# Patient Record
Sex: FEMALE | Race: WHITE | NOT HISPANIC OR LATINO | Employment: FULL TIME | URBAN - METROPOLITAN AREA
[De-identification: names, ages, dates, MRNs, and addresses within clinical notes are randomized per-mention and may not be internally consistent; named-entity substitution may affect disease eponyms.]

---

## 2017-01-03 ENCOUNTER — GENERIC CONVERSION - ENCOUNTER (OUTPATIENT)
Dept: OTHER | Facility: OTHER | Age: 32
End: 2017-01-03

## 2017-01-03 ENCOUNTER — ALLSCRIPTS OFFICE VISIT (OUTPATIENT)
Dept: OTHER | Facility: OTHER | Age: 32
End: 2017-01-03

## 2017-01-03 DIAGNOSIS — Z34.82 ENCOUNTER FOR SUPERVISION OF OTHER NORMAL PREGNANCY, SECOND TRIMESTER: ICD-10-CM

## 2017-01-16 ENCOUNTER — GENERIC CONVERSION - ENCOUNTER (OUTPATIENT)
Dept: OTHER | Facility: OTHER | Age: 32
End: 2017-01-16

## 2017-01-21 ENCOUNTER — APPOINTMENT (OUTPATIENT)
Dept: LAB | Age: 32
End: 2017-01-21
Payer: COMMERCIAL

## 2017-01-21 ENCOUNTER — TRANSCRIBE ORDERS (OUTPATIENT)
Dept: ADMINISTRATIVE | Age: 32
End: 2017-01-21

## 2017-01-21 DIAGNOSIS — Z34.82 ENCOUNTER FOR SUPERVISION OF OTHER NORMAL PREGNANCY, SECOND TRIMESTER: ICD-10-CM

## 2017-01-21 LAB
BASOPHILS # BLD AUTO: 0.01 THOUSANDS/ΜL (ref 0–0.1)
BASOPHILS NFR BLD AUTO: 0 % (ref 0–1)
EOSINOPHIL # BLD AUTO: 0.09 THOUSAND/ΜL (ref 0–0.61)
EOSINOPHIL NFR BLD AUTO: 1 % (ref 0–6)
ERYTHROCYTE [DISTWIDTH] IN BLOOD BY AUTOMATED COUNT: 13.4 % (ref 11.6–15.1)
GLUCOSE 1H P 50 G GLC PO SERPL-MCNC: 127 MG/DL
HCT VFR BLD AUTO: 35.1 % (ref 34.8–46.1)
HGB BLD-MCNC: 11.5 G/DL (ref 11.5–15.4)
LYMPHOCYTES # BLD AUTO: 1.31 THOUSANDS/ΜL (ref 0.6–4.47)
LYMPHOCYTES NFR BLD AUTO: 16 % (ref 14–44)
MCH RBC QN AUTO: 28.4 PG (ref 26.8–34.3)
MCHC RBC AUTO-ENTMCNC: 32.8 G/DL (ref 31.4–37.4)
MCV RBC AUTO: 87 FL (ref 82–98)
MONOCYTES # BLD AUTO: 0.51 THOUSAND/ΜL (ref 0.17–1.22)
MONOCYTES NFR BLD AUTO: 6 % (ref 4–12)
NEUTROPHILS # BLD AUTO: 6.48 THOUSANDS/ΜL (ref 1.85–7.62)
NEUTS SEG NFR BLD AUTO: 77 % (ref 43–75)
NRBC BLD AUTO-RTO: 0 /100 WBCS
PLATELET # BLD AUTO: 202 THOUSANDS/UL (ref 149–390)
PMV BLD AUTO: 11.8 FL (ref 8.9–12.7)
RBC # BLD AUTO: 4.05 MILLION/UL (ref 3.81–5.12)
WBC # BLD AUTO: 8.43 THOUSAND/UL (ref 4.31–10.16)

## 2017-01-21 PROCEDURE — 36415 COLL VENOUS BLD VENIPUNCTURE: CPT

## 2017-01-21 PROCEDURE — 82950 GLUCOSE TEST: CPT

## 2017-01-21 PROCEDURE — 85025 COMPLETE CBC W/AUTO DIFF WBC: CPT

## 2017-01-30 ENCOUNTER — GENERIC CONVERSION - ENCOUNTER (OUTPATIENT)
Dept: OTHER | Facility: OTHER | Age: 32
End: 2017-01-30

## 2017-02-17 ENCOUNTER — GENERIC CONVERSION - ENCOUNTER (OUTPATIENT)
Dept: OTHER | Facility: OTHER | Age: 32
End: 2017-02-17

## 2017-02-28 ENCOUNTER — HOSPITAL ENCOUNTER (OUTPATIENT)
Facility: HOSPITAL | Age: 32
Discharge: HOME | End: 2017-02-28
Attending: OBSTETRICS & GYNECOLOGY | Admitting: OBSTETRICS & GYNECOLOGY
Payer: COMMERCIAL

## 2017-02-28 VITALS
HEART RATE: 80 BPM | SYSTOLIC BLOOD PRESSURE: 112 MMHG | RESPIRATION RATE: 18 BRPM | WEIGHT: 165 LBS | BODY MASS INDEX: 26.52 KG/M2 | HEIGHT: 66 IN | OXYGEN SATURATION: 95 % | DIASTOLIC BLOOD PRESSURE: 56 MMHG | TEMPERATURE: 98 F

## 2017-02-28 DIAGNOSIS — O26.90: ICD-10-CM

## 2017-02-28 LAB
ANION GAP SERPL CALCULATED.3IONS-SCNC: 7 MMOL/L (ref 4–13)
BASOPHILS # BLD MANUAL: 0.14 THOUSAND/UL (ref 0–0.1)
BASOPHILS NFR MAR MANUAL: 1 % (ref 0–1)
BUN SERPL-MCNC: 10 MG/DL (ref 5–25)
CALCIUM SERPL-MCNC: 8.7 MG/DL (ref 8.3–10.1)
CHLORIDE SERPL-SCNC: 107 MMOL/L (ref 100–108)
CO2 SERPL-SCNC: 25 MMOL/L (ref 21–32)
CREAT SERPL-MCNC: 0.49 MG/DL (ref 0.6–1.3)
EOSINOPHIL # BLD MANUAL: 0 THOUSAND/UL (ref 0–0.4)
EOSINOPHIL NFR BLD MANUAL: 0 % (ref 0–6)
ERYTHROCYTE [DISTWIDTH] IN BLOOD BY AUTOMATED COUNT: 13.3 % (ref 11.6–15.1)
GFR SERPL CREATININE-BSD FRML MDRD: >60 ML/MIN/1.73SQ M
GLUCOSE SERPL-MCNC: 92 MG/DL (ref 65–140)
HCT VFR BLD AUTO: 40.2 % (ref 34.8–46.1)
HGB BLD-MCNC: 13.4 G/DL (ref 11.5–15.4)
LYMPHOCYTES # BLD AUTO: 0.85 THOUSAND/UL (ref 0.6–4.47)
LYMPHOCYTES # BLD AUTO: 6 % (ref 14–44)
MCH RBC QN AUTO: 28 PG (ref 26.8–34.3)
MCHC RBC AUTO-ENTMCNC: 33.3 G/DL (ref 31.4–37.4)
MCV RBC AUTO: 84 FL (ref 82–98)
MONOCYTES # BLD AUTO: 0.71 THOUSAND/UL (ref 0–1.22)
MONOCYTES NFR BLD: 5 % (ref 4–12)
NEUTROPHILS # BLD MANUAL: 12.47 THOUSAND/UL (ref 1.85–7.62)
NEUTS BAND NFR BLD MANUAL: 3 % (ref 0–8)
NEUTS SEG NFR BLD AUTO: 85 % (ref 43–75)
NRBC BLD AUTO-RTO: 0 /100 WBCS
PLATELET # BLD AUTO: 206 THOUSANDS/UL (ref 149–390)
PLATELET BLD QL SMEAR: ADEQUATE
PMV BLD AUTO: 11.8 FL (ref 8.9–12.7)
POTASSIUM SERPL-SCNC: 4 MMOL/L (ref 3.5–5.3)
RBC # BLD AUTO: 4.79 MILLION/UL (ref 3.81–5.12)
RBC MORPH BLD: NORMAL
SODIUM SERPL-SCNC: 139 MMOL/L (ref 136–145)
WBC # BLD AUTO: 14.17 THOUSAND/UL (ref 4.31–10.16)

## 2017-02-28 PROCEDURE — 99214 OFFICE O/P EST MOD 30 MIN: CPT

## 2017-02-28 PROCEDURE — 80048 BASIC METABOLIC PNL TOTAL CA: CPT | Performed by: OBSTETRICS & GYNECOLOGY

## 2017-02-28 PROCEDURE — 85027 COMPLETE CBC AUTOMATED: CPT | Performed by: OBSTETRICS & GYNECOLOGY

## 2017-02-28 PROCEDURE — 96360 HYDRATION IV INFUSION INIT: CPT

## 2017-02-28 PROCEDURE — 96374 THER/PROPH/DIAG INJ IV PUSH: CPT

## 2017-02-28 PROCEDURE — 85007 BL SMEAR W/DIFF WBC COUNT: CPT | Performed by: OBSTETRICS & GYNECOLOGY

## 2017-02-28 RX ORDER — ONDANSETRON 2 MG/ML
INJECTION INTRAMUSCULAR; INTRAVENOUS
Status: COMPLETED
Start: 2017-02-28 | End: 2017-02-28

## 2017-02-28 RX ORDER — ONDANSETRON 2 MG/ML
4 INJECTION INTRAMUSCULAR; INTRAVENOUS ONCE
Status: COMPLETED | OUTPATIENT
Start: 2017-02-28 | End: 2017-02-28

## 2017-02-28 RX ADMIN — SODIUM CHLORIDE, SODIUM LACTATE, POTASSIUM CHLORIDE, AND CALCIUM CHLORIDE 1000 ML: .6; .31; .03; .02 INJECTION, SOLUTION INTRAVENOUS at 06:47

## 2017-02-28 RX ADMIN — ONDANSETRON 4 MG: 2 INJECTION INTRAMUSCULAR; INTRAVENOUS at 06:48

## 2017-02-28 RX ADMIN — SODIUM CHLORIDE, SODIUM LACTATE, POTASSIUM CHLORIDE, AND CALCIUM CHLORIDE 1000 ML: .6; .31; .03; .02 INJECTION, SOLUTION INTRAVENOUS at 07:41

## 2017-03-02 LAB — EXTERNAL GROUP B STREP ANTIGEN: POSITIVE

## 2017-03-04 ENCOUNTER — LAB CONVERSION - ENCOUNTER (OUTPATIENT)
Dept: OTHER | Facility: OTHER | Age: 32
End: 2017-03-04

## 2017-03-10 ENCOUNTER — GENERIC CONVERSION - ENCOUNTER (OUTPATIENT)
Dept: OTHER | Facility: OTHER | Age: 32
End: 2017-03-10

## 2017-03-17 ENCOUNTER — GENERIC CONVERSION - ENCOUNTER (OUTPATIENT)
Dept: OTHER | Facility: OTHER | Age: 32
End: 2017-03-17

## 2017-03-21 ENCOUNTER — HOSPITAL ENCOUNTER (OUTPATIENT)
Facility: HOSPITAL | Age: 32
Discharge: HOME | End: 2017-03-21
Attending: OBSTETRICS & GYNECOLOGY | Admitting: OBSTETRICS & GYNECOLOGY
Payer: COMMERCIAL

## 2017-03-21 VITALS
SYSTOLIC BLOOD PRESSURE: 126 MMHG | RESPIRATION RATE: 18 BRPM | DIASTOLIC BLOOD PRESSURE: 72 MMHG | HEART RATE: 96 BPM | TEMPERATURE: 98 F

## 2017-03-21 PROCEDURE — 99214 OFFICE O/P EST MOD 30 MIN: CPT

## 2017-03-24 ENCOUNTER — GENERIC CONVERSION - ENCOUNTER (OUTPATIENT)
Dept: OTHER | Facility: OTHER | Age: 32
End: 2017-03-24

## 2017-03-29 ENCOUNTER — HOSPITAL ENCOUNTER (INPATIENT)
Facility: HOSPITAL | Age: 32
LOS: 3 days | Discharge: HOME | End: 2017-04-01
Attending: OBSTETRICS & GYNECOLOGY | Admitting: OBSTETRICS & GYNECOLOGY
Payer: COMMERCIAL

## 2017-03-29 DIAGNOSIS — O47.1 FALSE LABOR AFTER 37 WEEKS OF GESTATION WITHOUT DELIVERY: ICD-10-CM

## 2017-03-29 DIAGNOSIS — Z3A.40 40 WEEKS GESTATION OF PREGNANCY: Primary | ICD-10-CM

## 2017-03-29 LAB
ABO GROUP BLD: NORMAL
BASOPHILS # BLD AUTO: 0.01 THOUSANDS/ΜL (ref 0–0.1)
BASOPHILS NFR BLD AUTO: 0 % (ref 0–1)
BLD GP AB SCN SERPL QL: NEGATIVE
EOSINOPHIL # BLD AUTO: 0.06 THOUSAND/ΜL (ref 0–0.61)
EOSINOPHIL NFR BLD AUTO: 1 % (ref 0–6)
ERYTHROCYTE [DISTWIDTH] IN BLOOD BY AUTOMATED COUNT: 13.4 % (ref 11.6–15.1)
HCT VFR BLD AUTO: 37.7 % (ref 34.8–46.1)
HGB BLD-MCNC: 12.4 G/DL (ref 11.5–15.4)
LYMPHOCYTES # BLD AUTO: 2.23 THOUSANDS/ΜL (ref 0.6–4.47)
LYMPHOCYTES NFR BLD AUTO: 19 % (ref 14–44)
MCH RBC QN AUTO: 27.3 PG (ref 26.8–34.3)
MCHC RBC AUTO-ENTMCNC: 32.9 G/DL (ref 31.4–37.4)
MCV RBC AUTO: 83 FL (ref 82–98)
MONOCYTES # BLD AUTO: 0.99 THOUSAND/ΜL (ref 0.17–1.22)
MONOCYTES NFR BLD AUTO: 8 % (ref 4–12)
NEUTROPHILS # BLD AUTO: 8.71 THOUSANDS/ΜL (ref 1.85–7.62)
NEUTS SEG NFR BLD AUTO: 72 % (ref 43–75)
NRBC BLD AUTO-RTO: 0 /100 WBCS
PLATELET # BLD AUTO: 220 THOUSANDS/UL (ref 149–390)
PMV BLD AUTO: 11.7 FL (ref 8.9–12.7)
RBC # BLD AUTO: 4.55 MILLION/UL (ref 3.81–5.12)
RH BLD: POSITIVE
WBC # BLD AUTO: 12.06 THOUSAND/UL (ref 4.31–10.16)

## 2017-03-29 PROCEDURE — 86900 BLOOD TYPING SEROLOGIC ABO: CPT | Performed by: OBSTETRICS & GYNECOLOGY

## 2017-03-29 PROCEDURE — 86592 SYPHILIS TEST NON-TREP QUAL: CPT | Performed by: OBSTETRICS & GYNECOLOGY

## 2017-03-29 PROCEDURE — 85025 COMPLETE CBC W/AUTO DIFF WBC: CPT | Performed by: OBSTETRICS & GYNECOLOGY

## 2017-03-29 PROCEDURE — 86850 RBC ANTIBODY SCREEN: CPT | Performed by: OBSTETRICS & GYNECOLOGY

## 2017-03-29 PROCEDURE — 99214 OFFICE O/P EST MOD 30 MIN: CPT

## 2017-03-29 PROCEDURE — 86901 BLOOD TYPING SEROLOGIC RH(D): CPT | Performed by: OBSTETRICS & GYNECOLOGY

## 2017-03-29 RX ORDER — SODIUM CHLORIDE, SODIUM LACTATE, POTASSIUM CHLORIDE, CALCIUM CHLORIDE 600; 310; 30; 20 MG/100ML; MG/100ML; MG/100ML; MG/100ML
125 INJECTION, SOLUTION INTRAVENOUS CONTINUOUS
Status: DISCONTINUED | OUTPATIENT
Start: 2017-03-29 | End: 2017-03-30

## 2017-03-29 RX ADMIN — SODIUM CHLORIDE, SODIUM LACTATE, POTASSIUM CHLORIDE, AND CALCIUM CHLORIDE 125 ML/HR: .6; .31; .03; .02 INJECTION, SOLUTION INTRAVENOUS at 21:41

## 2017-03-29 RX ADMIN — SODIUM CHLORIDE 5 MILLION UNITS: 0.9 INJECTION, SOLUTION INTRAVENOUS at 21:41

## 2017-03-30 ENCOUNTER — ANESTHESIA (INPATIENT)
Dept: LABOR AND DELIVERY | Facility: HOSPITAL | Age: 32
End: 2017-03-30
Payer: COMMERCIAL

## 2017-03-30 ENCOUNTER — ANESTHESIA EVENT (INPATIENT)
Dept: LABOR AND DELIVERY | Facility: HOSPITAL | Age: 32
End: 2017-03-30
Payer: COMMERCIAL

## 2017-03-30 PROBLEM — Z3A.40 40 WEEKS GESTATION OF PREGNANCY: Status: RESOLVED | Noted: 2017-03-29 | Resolved: 2017-03-30

## 2017-03-30 LAB
BASE EXCESS BLDCOA CALC-SCNC: -1.2 MMOL/L (ref 3–11)
BASE EXCESS BLDCOV CALC-SCNC: -0.1 MMOL/L (ref 1–9)
HCO3 BLDCOA-SCNC: 25.8 MMOL/L (ref 17.3–27.3)
HCO3 BLDCOV-SCNC: 25.5 MMOL/L (ref 12.2–28.6)
O2 CT VFR BLDCOA CALC: 11.7 ML/DL
OXYHGB MFR BLDCOA: 51.2 %
OXYHGB MFR BLDCOV: 75.1 %
PCO2 BLDCOA: 51.2 MM[HG] (ref 30–60)
PCO2 BLDCOV: 45.2 MM HG (ref 27–43)
PH BLDCOA: 7.32 [PH] (ref 7.23–7.43)
PH BLDCOV: 7.37 [PH] (ref 7.19–7.49)
PO2 BLDCOA: 21.6 MM HG (ref 5–25)
PO2 BLDCOV: 30 MM HG (ref 15–45)
SAO2 % BLDCOV: 17.2 ML/DL

## 2017-03-30 PROCEDURE — 82805 BLOOD GASES W/O2 SATURATION: CPT | Performed by: OBSTETRICS & GYNECOLOGY

## 2017-03-30 RX ORDER — OXYCODONE HYDROCHLORIDE AND ACETAMINOPHEN 5; 325 MG/1; MG/1
2 TABLET ORAL EVERY 4 HOURS PRN
Status: DISCONTINUED | OUTPATIENT
Start: 2017-03-30 | End: 2017-04-01 | Stop reason: HOSPADM

## 2017-03-30 RX ORDER — OXYTOCIN/RINGER'S LACTATE 30/500 ML
2 PLASTIC BAG, INJECTION (ML) INTRAVENOUS
Status: DISCONTINUED | OUTPATIENT
Start: 2017-03-30 | End: 2017-03-30

## 2017-03-30 RX ORDER — BISACODYL 10 MG
10 SUPPOSITORY, RECTAL RECTAL DAILY PRN
Status: DISCONTINUED | OUTPATIENT
Start: 2017-03-30 | End: 2017-04-01 | Stop reason: HOSPADM

## 2017-03-30 RX ORDER — DIPHENHYDRAMINE HCL 25 MG
25 TABLET ORAL EVERY 6 HOURS PRN
Status: DISCONTINUED | OUTPATIENT
Start: 2017-03-30 | End: 2017-04-01 | Stop reason: HOSPADM

## 2017-03-30 RX ORDER — SIMETHICONE 80 MG
80 TABLET,CHEWABLE ORAL 4 TIMES DAILY PRN
Status: DISCONTINUED | OUTPATIENT
Start: 2017-03-30 | End: 2017-04-01 | Stop reason: HOSPADM

## 2017-03-30 RX ORDER — DIAPER,BRIEF,INFANT-TODD,DISP
1 EACH MISCELLANEOUS AS NEEDED
Status: DISCONTINUED | OUTPATIENT
Start: 2017-03-30 | End: 2017-04-01 | Stop reason: HOSPADM

## 2017-03-30 RX ORDER — OXYCODONE HYDROCHLORIDE AND ACETAMINOPHEN 5; 325 MG/1; MG/1
1 TABLET ORAL EVERY 4 HOURS PRN
Status: DISCONTINUED | OUTPATIENT
Start: 2017-03-30 | End: 2017-04-01 | Stop reason: HOSPADM

## 2017-03-30 RX ORDER — OXYTOCIN/RINGER'S LACTATE 30/500 ML
250 PLASTIC BAG, INJECTION (ML) INTRAVENOUS CONTINUOUS
Status: ACTIVE | OUTPATIENT
Start: 2017-03-30 | End: 2017-03-30

## 2017-03-30 RX ORDER — ROPIVACAINE HYDROCHLORIDE 2 MG/ML
INJECTION, SOLUTION EPIDURAL; INFILTRATION; PERINEURAL AS NEEDED
Status: DISCONTINUED | OUTPATIENT
Start: 2017-03-30 | End: 2017-03-30 | Stop reason: SURG

## 2017-03-30 RX ORDER — ACETAMINOPHEN 325 MG/1
650 TABLET ORAL EVERY 4 HOURS PRN
Status: DISCONTINUED | OUTPATIENT
Start: 2017-03-30 | End: 2017-04-01 | Stop reason: HOSPADM

## 2017-03-30 RX ORDER — OXYTOCIN/RINGER'S LACTATE 30/500 ML
PLASTIC BAG, INJECTION (ML) INTRAVENOUS
Status: COMPLETED
Start: 2017-03-30 | End: 2017-03-30

## 2017-03-30 RX ORDER — DOCUSATE SODIUM 100 MG/1
100 CAPSULE, LIQUID FILLED ORAL 2 TIMES DAILY
Status: DISCONTINUED | OUTPATIENT
Start: 2017-03-30 | End: 2017-04-01 | Stop reason: HOSPADM

## 2017-03-30 RX ORDER — IBUPROFEN 600 MG/1
600 TABLET ORAL EVERY 6 HOURS PRN
Status: DISCONTINUED | OUTPATIENT
Start: 2017-03-30 | End: 2017-04-01 | Stop reason: HOSPADM

## 2017-03-30 RX ORDER — ROPIVACAINE HYDROCHLORIDE 2 MG/ML
INJECTION, SOLUTION EPIDURAL; INFILTRATION; PERINEURAL CONTINUOUS PRN
Status: DISCONTINUED | OUTPATIENT
Start: 2017-03-30 | End: 2017-03-30 | Stop reason: SURG

## 2017-03-30 RX ORDER — LIDOCAINE HYDROCHLORIDE AND EPINEPHRINE 15; 5 MG/ML; UG/ML
INJECTION, SOLUTION EPIDURAL AS NEEDED
Status: DISCONTINUED | OUTPATIENT
Start: 2017-03-30 | End: 2017-03-30 | Stop reason: SURG

## 2017-03-30 RX ADMIN — LIDOCAINE HYDROCHLORIDE AND EPINEPHRINE 3 ML: 15; 5 INJECTION, SOLUTION EPIDURAL at 03:58

## 2017-03-30 RX ADMIN — DOCUSATE SODIUM 100 MG: 100 CAPSULE, LIQUID FILLED ORAL at 08:04

## 2017-03-30 RX ADMIN — IBUPROFEN 600 MG: 600 TABLET, FILM COATED ORAL at 17:08

## 2017-03-30 RX ADMIN — SODIUM CHLORIDE, SODIUM LACTATE, POTASSIUM CHLORIDE, AND CALCIUM CHLORIDE 125 ML/HR: .6; .31; .03; .02 INJECTION, SOLUTION INTRAVENOUS at 05:29

## 2017-03-30 RX ADMIN — SODIUM CHLORIDE, SODIUM LACTATE, POTASSIUM CHLORIDE, AND CALCIUM CHLORIDE 999 ML/HR: .6; .31; .03; .02 INJECTION, SOLUTION INTRAVENOUS at 03:32

## 2017-03-30 RX ADMIN — DOCUSATE SODIUM 100 MG: 100 CAPSULE, LIQUID FILLED ORAL at 17:10

## 2017-03-30 RX ADMIN — LIDOCAINE HYDROCHLORIDE AND EPINEPHRINE 3 ML: 15; 5 INJECTION, SOLUTION EPIDURAL at 03:59

## 2017-03-30 RX ADMIN — Medication 2 MILLI-UNITS/MIN: at 05:01

## 2017-03-30 RX ADMIN — BENZOCAINE AND MENTHOL: 20; .5 SPRAY TOPICAL at 08:04

## 2017-03-30 RX ADMIN — SODIUM CHLORIDE 2.5 MILLION UNITS: 9 INJECTION, SOLUTION INTRAVENOUS at 05:29

## 2017-03-30 RX ADMIN — HYDROCORTISONE 1 APPLICATION: 10 CREAM TOPICAL at 08:04

## 2017-03-30 RX ADMIN — ROPIVACAINE HYDROCHLORIDE 7 ML: 2 INJECTION, SOLUTION EPIDURAL; INFILTRATION at 03:59

## 2017-03-30 RX ADMIN — WITCH HAZEL 1 PAD: 500 SOLUTION RECTAL; TOPICAL at 08:04

## 2017-03-30 RX ADMIN — SODIUM CHLORIDE 2.5 MILLION UNITS: 9 INJECTION, SOLUTION INTRAVENOUS at 01:24

## 2017-03-30 RX ADMIN — IBUPROFEN 600 MG: 600 TABLET, FILM COATED ORAL at 23:19

## 2017-03-30 RX ADMIN — ROPIVACAINE HYDROCHLORIDE 12 ML/HR: 2 INJECTION, SOLUTION EPIDURAL; INFILTRATION at 03:59

## 2017-03-30 RX ADMIN — IBUPROFEN 600 MG: 600 TABLET, FILM COATED ORAL at 11:07

## 2017-03-31 LAB — RPR SER QL: NORMAL

## 2017-03-31 RX ADMIN — DOCUSATE SODIUM 100 MG: 100 CAPSULE, LIQUID FILLED ORAL at 08:31

## 2017-03-31 RX ADMIN — DOCUSATE SODIUM 100 MG: 100 CAPSULE, LIQUID FILLED ORAL at 17:23

## 2017-03-31 RX ADMIN — IBUPROFEN 600 MG: 600 TABLET, FILM COATED ORAL at 17:23

## 2017-03-31 RX ADMIN — IBUPROFEN 600 MG: 600 TABLET, FILM COATED ORAL at 06:18

## 2017-04-01 VITALS
OXYGEN SATURATION: 99 % | SYSTOLIC BLOOD PRESSURE: 123 MMHG | HEART RATE: 87 BPM | RESPIRATION RATE: 20 BRPM | BODY MASS INDEX: 28.93 KG/M2 | DIASTOLIC BLOOD PRESSURE: 77 MMHG | HEIGHT: 66 IN | WEIGHT: 180 LBS | TEMPERATURE: 98.2 F

## 2017-04-01 RX ORDER — IBUPROFEN 600 MG/1
600 TABLET ORAL EVERY 6 HOURS PRN
Qty: 30 TABLET | Refills: 0
Start: 2017-04-01 | End: 2017-06-07

## 2017-04-01 RX ADMIN — IBUPROFEN 600 MG: 600 TABLET, FILM COATED ORAL at 08:54

## 2017-04-01 RX ADMIN — DOCUSATE SODIUM 100 MG: 100 CAPSULE, LIQUID FILLED ORAL at 08:54

## 2017-04-07 LAB — PLACENTA IN STORAGE: NORMAL

## 2017-04-21 ENCOUNTER — GENERIC CONVERSION - ENCOUNTER (OUTPATIENT)
Dept: OTHER | Facility: OTHER | Age: 32
End: 2017-04-21

## 2017-04-21 ENCOUNTER — TELEPHONE (OUTPATIENT)
Dept: LABOR AND DELIVERY | Facility: HOSPITAL | Age: 32
End: 2017-04-21

## 2017-05-02 ENCOUNTER — GENERIC CONVERSION - ENCOUNTER (OUTPATIENT)
Dept: OTHER | Facility: OTHER | Age: 32
End: 2017-05-02

## 2017-06-02 PROBLEM — Z30.2 ENCOUNTER FOR FEMALE STERILIZATION PROCEDURE: Status: ACTIVE | Noted: 2017-06-02

## 2017-06-08 ENCOUNTER — ANESTHESIA (OUTPATIENT)
Dept: PERIOP | Facility: HOSPITAL | Age: 32
End: 2017-06-08
Payer: COMMERCIAL

## 2017-06-08 ENCOUNTER — ANESTHESIA EVENT (OUTPATIENT)
Dept: PERIOP | Facility: HOSPITAL | Age: 32
End: 2017-06-08
Payer: COMMERCIAL

## 2017-06-08 ENCOUNTER — HOSPITAL ENCOUNTER (OUTPATIENT)
Facility: HOSPITAL | Age: 32
Setting detail: OUTPATIENT SURGERY
Discharge: HOME | End: 2017-06-08
Attending: OBSTETRICS & GYNECOLOGY | Admitting: OBSTETRICS & GYNECOLOGY
Payer: COMMERCIAL

## 2017-06-08 VITALS
HEIGHT: 66 IN | BODY MASS INDEX: 23.14 KG/M2 | HEART RATE: 61 BPM | DIASTOLIC BLOOD PRESSURE: 50 MMHG | TEMPERATURE: 100.3 F | RESPIRATION RATE: 16 BRPM | WEIGHT: 144 LBS | OXYGEN SATURATION: 97 % | SYSTOLIC BLOOD PRESSURE: 112 MMHG

## 2017-06-08 DIAGNOSIS — Z30.2 ENCOUNTER FOR STERILIZATION: ICD-10-CM

## 2017-06-08 LAB — EXT PREGNANCY TEST URINE: NEGATIVE

## 2017-06-08 PROCEDURE — 81025 URINE PREGNANCY TEST: CPT | Performed by: OBSTETRICS & GYNECOLOGY

## 2017-06-08 PROCEDURE — 88302 TISSUE EXAM BY PATHOLOGIST: CPT | Performed by: OBSTETRICS & GYNECOLOGY

## 2017-06-08 RX ORDER — SODIUM CHLORIDE, SODIUM LACTATE, POTASSIUM CHLORIDE, CALCIUM CHLORIDE 600; 310; 30; 20 MG/100ML; MG/100ML; MG/100ML; MG/100ML
125 INJECTION, SOLUTION INTRAVENOUS CONTINUOUS
Status: DISCONTINUED | OUTPATIENT
Start: 2017-06-08 | End: 2017-06-08 | Stop reason: HOSPADM

## 2017-06-08 RX ORDER — ONDANSETRON 2 MG/ML
4 INJECTION INTRAMUSCULAR; INTRAVENOUS ONCE AS NEEDED
Status: DISCONTINUED | OUTPATIENT
Start: 2017-06-08 | End: 2017-06-08 | Stop reason: HOSPADM

## 2017-06-08 RX ORDER — METOCLOPRAMIDE HYDROCHLORIDE 5 MG/ML
10 INJECTION INTRAMUSCULAR; INTRAVENOUS ONCE AS NEEDED
Status: DISCONTINUED | OUTPATIENT
Start: 2017-06-08 | End: 2017-06-08 | Stop reason: HOSPADM

## 2017-06-08 RX ORDER — GLYCOPYRROLATE 0.2 MG/ML
INJECTION INTRAMUSCULAR; INTRAVENOUS AS NEEDED
Status: DISCONTINUED | OUTPATIENT
Start: 2017-06-08 | End: 2017-06-08 | Stop reason: SURG

## 2017-06-08 RX ORDER — CLINDAMYCIN PHOSPHATE 900 MG/50ML
900 INJECTION INTRAVENOUS ONCE
Status: DISCONTINUED | OUTPATIENT
Start: 2017-06-08 | End: 2017-06-08 | Stop reason: HOSPADM

## 2017-06-08 RX ORDER — OXYCODONE HYDROCHLORIDE AND ACETAMINOPHEN 5; 325 MG/1; MG/1
2 TABLET ORAL EVERY 4 HOURS PRN
Status: DISCONTINUED | OUTPATIENT
Start: 2017-06-08 | End: 2017-06-08 | Stop reason: HOSPADM

## 2017-06-08 RX ORDER — OXYCODONE HYDROCHLORIDE AND ACETAMINOPHEN 5; 325 MG/1; MG/1
1 TABLET ORAL EVERY 4 HOURS PRN
Status: DISCONTINUED | OUTPATIENT
Start: 2017-06-08 | End: 2017-06-08 | Stop reason: HOSPADM

## 2017-06-08 RX ORDER — ONDANSETRON 2 MG/ML
INJECTION INTRAMUSCULAR; INTRAVENOUS AS NEEDED
Status: DISCONTINUED | OUTPATIENT
Start: 2017-06-08 | End: 2017-06-08 | Stop reason: SURG

## 2017-06-08 RX ORDER — FENTANYL CITRATE 50 UG/ML
INJECTION, SOLUTION INTRAMUSCULAR; INTRAVENOUS AS NEEDED
Status: DISCONTINUED | OUTPATIENT
Start: 2017-06-08 | End: 2017-06-08 | Stop reason: SURG

## 2017-06-08 RX ORDER — MEPERIDINE HYDROCHLORIDE 25 MG/ML
12.5 INJECTION INTRAMUSCULAR; INTRAVENOUS; SUBCUTANEOUS ONCE AS NEEDED
Status: DISCONTINUED | OUTPATIENT
Start: 2017-06-08 | End: 2017-06-08 | Stop reason: HOSPADM

## 2017-06-08 RX ORDER — GENTAMICIN SULFATE 40 MG/ML
INJECTION, SOLUTION INTRAMUSCULAR; INTRAVENOUS AS NEEDED
Status: DISCONTINUED | OUTPATIENT
Start: 2017-06-08 | End: 2017-06-08 | Stop reason: SURG

## 2017-06-08 RX ORDER — IBUPROFEN 600 MG/1
600 TABLET ORAL EVERY 6 HOURS PRN
Qty: 30 TABLET | Refills: 0 | Status: SHIPPED | OUTPATIENT
Start: 2017-06-08 | End: 2017-06-08

## 2017-06-08 RX ORDER — BUPIVACAINE HYDROCHLORIDE 2.5 MG/ML
INJECTION, SOLUTION INFILTRATION; PERINEURAL AS NEEDED
Status: DISCONTINUED | OUTPATIENT
Start: 2017-06-08 | End: 2017-06-08 | Stop reason: HOSPADM

## 2017-06-08 RX ORDER — IBUPROFEN 600 MG/1
600 TABLET ORAL EVERY 6 HOURS PRN
Qty: 30 TABLET | Refills: 0 | Status: SHIPPED | OUTPATIENT
Start: 2017-06-08 | End: 2020-02-25

## 2017-06-08 RX ORDER — ONDANSETRON 2 MG/ML
4 INJECTION INTRAMUSCULAR; INTRAVENOUS EVERY 6 HOURS PRN
Status: DISCONTINUED | OUTPATIENT
Start: 2017-06-08 | End: 2017-06-08 | Stop reason: HOSPADM

## 2017-06-08 RX ORDER — OXYCODONE HYDROCHLORIDE AND ACETAMINOPHEN 5; 325 MG/1; MG/1
1-2 TABLET ORAL EVERY 4 HOURS PRN
Qty: 21 TABLET | Refills: 0 | Status: SHIPPED | OUTPATIENT
Start: 2017-06-08 | End: 2017-06-18

## 2017-06-08 RX ORDER — FENTANYL CITRATE/PF 50 MCG/ML
50 SYRINGE (ML) INJECTION
Status: DISCONTINUED | OUTPATIENT
Start: 2017-06-08 | End: 2017-06-08 | Stop reason: HOSPADM

## 2017-06-08 RX ORDER — IBUPROFEN 600 MG/1
600 TABLET ORAL EVERY 6 HOURS PRN
Status: DISCONTINUED | OUTPATIENT
Start: 2017-06-08 | End: 2017-06-08 | Stop reason: HOSPADM

## 2017-06-08 RX ORDER — KETOROLAC TROMETHAMINE 30 MG/ML
30 INJECTION, SOLUTION INTRAMUSCULAR; INTRAVENOUS ONCE
Status: COMPLETED | OUTPATIENT
Start: 2017-06-08 | End: 2017-06-08

## 2017-06-08 RX ORDER — GENTAMICIN SULFATE 100 MG/100ML
1.5 INJECTION, SOLUTION INTRAVENOUS ONCE
Status: DISCONTINUED | OUTPATIENT
Start: 2017-06-08 | End: 2017-06-08 | Stop reason: HOSPADM

## 2017-06-08 RX ORDER — CLINDAMYCIN PHOSPHATE 150 MG/ML
INJECTION, SOLUTION INTRAVENOUS AS NEEDED
Status: DISCONTINUED | OUTPATIENT
Start: 2017-06-08 | End: 2017-06-08 | Stop reason: SURG

## 2017-06-08 RX ORDER — ROCURONIUM BROMIDE 10 MG/ML
INJECTION, SOLUTION INTRAVENOUS AS NEEDED
Status: DISCONTINUED | OUTPATIENT
Start: 2017-06-08 | End: 2017-06-08 | Stop reason: SURG

## 2017-06-08 RX ORDER — PROPOFOL 10 MG/ML
INJECTION, EMULSION INTRAVENOUS AS NEEDED
Status: DISCONTINUED | OUTPATIENT
Start: 2017-06-08 | End: 2017-06-08 | Stop reason: SURG

## 2017-06-08 RX ORDER — SUCCINYLCHOLINE CHLORIDE 20 MG/ML
INJECTION INTRAMUSCULAR; INTRAVENOUS AS NEEDED
Status: DISCONTINUED | OUTPATIENT
Start: 2017-06-08 | End: 2017-06-08 | Stop reason: SURG

## 2017-06-08 RX ADMIN — ROCURONIUM BROMIDE 30 MG: 10 INJECTION, SOLUTION INTRAVENOUS at 15:38

## 2017-06-08 RX ADMIN — KETOROLAC TROMETHAMINE 30 MG: 30 INJECTION, SOLUTION INTRAMUSCULAR at 16:56

## 2017-06-08 RX ADMIN — ONDANSETRON 4 MG: 2 INJECTION INTRAMUSCULAR; INTRAVENOUS at 15:53

## 2017-06-08 RX ADMIN — NEOSTIGMINE METHYLSULFATE 3 MG: 1 INJECTION, SOLUTION INTRAMUSCULAR; INTRAVENOUS; SUBCUTANEOUS at 16:13

## 2017-06-08 RX ADMIN — CLINDAMYCIN PHOSPHATE 900 MG: 150 INJECTION, SOLUTION INTRAMUSCULAR; INTRAVENOUS at 15:34

## 2017-06-08 RX ADMIN — GLYCOPYRROLATE 0.4 MG: 0.2 INJECTION INTRAMUSCULAR; INTRAVENOUS at 16:13

## 2017-06-08 RX ADMIN — PROPOFOL 200 MG: 10 INJECTION, EMULSION INTRAVENOUS at 15:38

## 2017-06-08 RX ADMIN — FENTANYL CITRATE 50 MCG: 50 INJECTION, SOLUTION INTRAMUSCULAR; INTRAVENOUS at 15:45

## 2017-06-08 RX ADMIN — GENTAMICIN SULFATE 100 MG: 40 INJECTION, SOLUTION INTRAMUSCULAR; INTRAVENOUS at 15:37

## 2017-06-08 RX ADMIN — IBUPROFEN 600 MG: 600 TABLET, FILM COATED ORAL at 18:49

## 2017-06-08 RX ADMIN — SODIUM CHLORIDE, SODIUM LACTATE, POTASSIUM CHLORIDE, AND CALCIUM CHLORIDE 125 ML/HR: .6; .31; .03; .02 INJECTION, SOLUTION INTRAVENOUS at 12:26

## 2017-06-08 RX ADMIN — LIDOCAINE HYDROCHLORIDE 60 MG: 20 INJECTION, SOLUTION INTRAVENOUS at 15:38

## 2017-06-08 RX ADMIN — SUCCINYLCHOLINE CHLORIDE 100 MG: 20 INJECTION, SOLUTION INTRAMUSCULAR; INTRAVENOUS at 15:38

## 2017-06-08 RX ADMIN — FENTANYL CITRATE 50 MCG: 50 INJECTION INTRAMUSCULAR; INTRAVENOUS at 17:17

## 2017-06-16 ENCOUNTER — ALLSCRIPTS OFFICE VISIT (OUTPATIENT)
Dept: OTHER | Facility: OTHER | Age: 32
End: 2017-06-16

## 2017-10-12 ENCOUNTER — APPOINTMENT (OUTPATIENT)
Dept: RADIOLOGY | Age: 32
End: 2017-10-12
Payer: COMMERCIAL

## 2017-10-12 ENCOUNTER — TRANSCRIBE ORDERS (OUTPATIENT)
Dept: ADMINISTRATIVE | Age: 32
End: 2017-10-12

## 2017-10-12 DIAGNOSIS — M25.532 LEFT WRIST PAIN: ICD-10-CM

## 2017-10-12 DIAGNOSIS — M25.532 LEFT WRIST PAIN: Primary | ICD-10-CM

## 2017-10-12 PROCEDURE — 73110 X-RAY EXAM OF WRIST: CPT

## 2017-10-25 ENCOUNTER — APPOINTMENT (OUTPATIENT)
Dept: PHYSICAL THERAPY | Age: 32
End: 2017-10-25
Payer: COMMERCIAL

## 2017-10-25 PROCEDURE — 97161 PT EVAL LOW COMPLEX 20 MIN: CPT

## 2017-10-25 PROCEDURE — G8990 OTHER PT/OT CURRENT STATUS: HCPCS

## 2017-10-25 PROCEDURE — G8991 OTHER PT/OT GOAL STATUS: HCPCS

## 2017-11-03 ENCOUNTER — ALLSCRIPTS OFFICE VISIT (OUTPATIENT)
Dept: OTHER | Facility: OTHER | Age: 32
End: 2017-11-03

## 2017-11-04 NOTE — PROGRESS NOTES
Assessment  1  De Quervain's tenosynovitis, left (047 04) (M65 4)    Plan  De Quervain's tenosynovitis, left    · Start: Diclofenac Sodium 1 % Transdermal Gel (Voltaren); APPLY TO UPPER  EXTREMITIES, 2 GM OF GEL TO AFFECTED AREA 4 TIMES DAILY  DO  NOT APPLY MORE THAN 8 GM DAILY TO ANY ONE AFFECTED JOINT    Discussion/Summary    Left wrist de Quervain tenosynovitisTopical anti-inflammatory cream prescribedthumb spica splint providedis going to [employer] and she will follow up with us on a p r n  basis if she continues to have pain she will come back for a cortisone injection  She understands my recommendation of pumping and jumping for 24-48 hours after injection because of the potential effects of steroids  Otherwise she can talk to her her OBGYN and have a note saying that she does not need to pump and dump  History of Present Illness  HPI: Patient comes in today with regards to her left wrist  She reports having wrist pain for about a couple months  Basically since the birth of her daughter  Thumb feels like it gets stuck at times  Twisting motion hurts  Pain ranges from 1 out 10-7 out 10  No medications except Tylenol  The pain is on the radial side of the wrist  Past medical history is otherwise unremarkable review of systems are unremarkable  She does not smoke but she does drink alcohol  1        1 Amended By: Irma Randolph; Nov 03 2017 9:55 AM EST    Review of Systems    Constitutional: No fever, no chills, feels well, no tiredness, no recent weight gain or loss  Eyes: No complaints of eyesight problems, no red eyes  ENT: no loss of hearing, no nosebleeds, no sore throat  Cardiovascular: No complaints of chest pain, no palpitations, no leg claudication or lower extremity edema  Respiratory: no compliants of shortness of breath, no wheezing, no cough  Gastrointestinal: no complaints of abdominal pain, no constipation, no nausea or diarrhea, no vomiting, no bloody stools     Genitourinary: no complaints of dysuria, no incontinence  Musculoskeletal: as noted in HPI  Integumentary: no complaints of skin rash or lesion, no itching or dry skin, no skin wounds  Neurological: no complaints of headache, no confusion, no numbness or tingling, no dizziness  Endocrine: No complaints of muscle weakness, no feelings of weakness, no frequent urination, no excessive thirst    Psychiatric: No suicidal thoughts, no anxiety, no feelings of depression  Active Problems  1  Encounter for sterilization (V25 2) (Z30 2)  2  Postpartum exam (V24 2) (Z39 2)    Past Medical History   · History of Encounter for pregnancy related examination in first trimester (V22 1) (Z34 91)   · History of Encounter for pregnancy related examination in second trimester (V22 1)  (Z34 92)   · History of Encounter for pregnancy related examination in third trimester (V22 1) (Z34 93)   · History of esophageal reflux (V12 79) (Z87 19)   · History of pregnancy (V13 29)   · History of seasonal allergies (V15 09) (Z88 9)    The active problems and past medical history were reviewed and updated today  Surgical History   · History of Colposcopy   · History of Tonsillectomy With Adenoidectomy   · History of Tubal Ligation    The surgical history was reviewed and updated today  Family History  Father    · Family history of hypertension (V17 49) (Z82 49)  Maternal Grandmother    · FHx: ovarian cancer (V16 41) (Z80 41)  Maternal Grandfather    · Family history of diabetes mellitus (V18 0) (Z83 3)  Paternal Grandfather    · Family history of colon cancer (V16 0) (Z80 0)  Maternal Aunt    · Family history of breast cancer (V16 3) (Z80 3)    The family history was reviewed and updated today  Social History   · Denied: History of H/O domestic violence   · Never a smoker   · No alcohol use   · No drug use  The social history was reviewed and updated today  Current Meds  1  Tylenol TABS;    Therapy: ([id number]) to Recorded    The medication list was reviewed and updated today  Allergies  1  Ceclor CAPS    Vitals  Signs   Heart Rate: 98  Systolic: 614, Sitting  Diastolic: 68, Sitting  Weight: 144 lb 6 oz  BMI Calculated: 23 3  BSA Calculated: 1 74    Physical Exam  No audible wheeze no shortness of breath no appreciable erythema  Examination of the thumb there is some swelling over the 1st and 2nd dorsal compartment  She is pinpoint tender she has positive Finkelstein's test    Musculoskeletal - Digits and nails: Normal -- Inspection/palpation of joints, bones, and muscles: Normal -- Muscle strength/tone: Abnormal -- Upper extremity compartments: Normal    Cardiovascular - Pulses: Normal    Lymphatic - Palpation of lymph nodes in other areas: Normal    Skin - Skin and subcutaneous tissue: Normal    Neurologic - Reflexes: Normal -- Sensation: Normal -- Upper extremity peripheral neuro exam: Normal    Psychiatric - Orientation to person, place, and time: Normal -- Mood and affect: Normal    Eyes   Conjunctiva and lids: Normal        Results/Data  I personally reviewed the films/images/results in the office today  My interpretation follows  X-ray Review No osseous deformity        Signatures   Electronically signed by : Markos Beltran DO; Nov  3 2017  9:55AM EST                       (Author)

## 2018-01-12 VITALS
WEIGHT: 144.38 LBS | DIASTOLIC BLOOD PRESSURE: 68 MMHG | SYSTOLIC BLOOD PRESSURE: 101 MMHG | HEART RATE: 98 BPM | BODY MASS INDEX: 23.3 KG/M2

## 2018-01-13 VITALS — WEIGHT: 146 LBS | SYSTOLIC BLOOD PRESSURE: 102 MMHG | BODY MASS INDEX: 23.56 KG/M2 | DIASTOLIC BLOOD PRESSURE: 64 MMHG

## 2018-01-22 VITALS — SYSTOLIC BLOOD PRESSURE: 98 MMHG | BODY MASS INDEX: 26.31 KG/M2 | DIASTOLIC BLOOD PRESSURE: 56 MMHG | WEIGHT: 163 LBS

## 2018-01-22 VITALS — SYSTOLIC BLOOD PRESSURE: 112 MMHG | DIASTOLIC BLOOD PRESSURE: 78 MMHG | BODY MASS INDEX: 26.15 KG/M2 | WEIGHT: 162 LBS

## 2018-01-22 VITALS — DIASTOLIC BLOOD PRESSURE: 80 MMHG | BODY MASS INDEX: 27.12 KG/M2 | SYSTOLIC BLOOD PRESSURE: 118 MMHG | WEIGHT: 168 LBS

## 2018-01-22 VITALS — DIASTOLIC BLOOD PRESSURE: 70 MMHG | SYSTOLIC BLOOD PRESSURE: 110 MMHG | BODY MASS INDEX: 25.02 KG/M2 | WEIGHT: 155 LBS

## 2018-01-22 VITALS — SYSTOLIC BLOOD PRESSURE: 102 MMHG | WEIGHT: 164 LBS | DIASTOLIC BLOOD PRESSURE: 66 MMHG | BODY MASS INDEX: 26.47 KG/M2

## 2018-01-22 VITALS — DIASTOLIC BLOOD PRESSURE: 80 MMHG | SYSTOLIC BLOOD PRESSURE: 110 MMHG | WEIGHT: 159 LBS | BODY MASS INDEX: 25.66 KG/M2

## 2018-01-22 VITALS — WEIGHT: 158 LBS | BODY MASS INDEX: 25.5 KG/M2 | SYSTOLIC BLOOD PRESSURE: 102 MMHG | DIASTOLIC BLOOD PRESSURE: 72 MMHG

## 2018-03-07 NOTE — PROGRESS NOTES
Education  [facility] 2nd Trimester:   Second Trimester Education provided:   benefits of breastfeeding, importance of exclusive breastfeeding, early initiation of breastfeeding, exclusive breastfeeding for the first 6 months, non-pharmacologic pain relief methods for labor, rooming-in on 24 hour basis and 28 week packet given  Mother has not registered for prenatal class  Thought has not been given to selecting a pediatrician  The mother has not discussed personal preferences with her provider  Prenatal education provided by: Tej Noramn PA-C      Signatures   Electronically signed by :  Tej Norman, [facility]; Samir  3 2017  9:57AM EST                       (Author)

## 2019-11-22 ENCOUNTER — TELEPHONE (OUTPATIENT)
Dept: OBGYN CLINIC | Facility: CLINIC | Age: 34
End: 2019-11-22

## 2019-12-12 ENCOUNTER — APPOINTMENT (OUTPATIENT)
Dept: LAB | Age: 34
End: 2019-12-12
Payer: COMMERCIAL

## 2019-12-12 ENCOUNTER — TRANSCRIBE ORDERS (OUTPATIENT)
Dept: ADMINISTRATIVE | Age: 34
End: 2019-12-12

## 2019-12-12 DIAGNOSIS — E55.9 AVITAMINOSIS D: ICD-10-CM

## 2019-12-12 DIAGNOSIS — Z13.6 SCREENING FOR ISCHEMIC HEART DISEASE: ICD-10-CM

## 2019-12-12 DIAGNOSIS — Z13.0 SCREENING FOR IRON DEFICIENCY ANEMIA: ICD-10-CM

## 2019-12-12 DIAGNOSIS — Z13.0 SCREENING FOR IRON DEFICIENCY ANEMIA: Primary | ICD-10-CM

## 2019-12-12 LAB
25(OH)D3 SERPL-MCNC: 18.8 NG/ML (ref 30–100)
ALBUMIN SERPL BCP-MCNC: 3.7 G/DL (ref 3.5–5)
ALP SERPL-CCNC: 51 U/L (ref 46–116)
ALT SERPL W P-5'-P-CCNC: 19 U/L (ref 12–78)
ANION GAP SERPL CALCULATED.3IONS-SCNC: 1 MMOL/L (ref 4–13)
AST SERPL W P-5'-P-CCNC: 12 U/L (ref 5–45)
BASOPHILS # BLD AUTO: 0.03 THOUSANDS/ΜL (ref 0–0.1)
BASOPHILS NFR BLD AUTO: 1 % (ref 0–1)
BILIRUB SERPL-MCNC: 0.39 MG/DL (ref 0.2–1)
BUN SERPL-MCNC: 13 MG/DL (ref 5–25)
CALCIUM SERPL-MCNC: 9 MG/DL (ref 8.3–10.1)
CHLORIDE SERPL-SCNC: 110 MMOL/L (ref 100–108)
CHOLEST SERPL-MCNC: 164 MG/DL (ref 50–200)
CO2 SERPL-SCNC: 30 MMOL/L (ref 21–32)
CREAT SERPL-MCNC: 0.69 MG/DL (ref 0.6–1.3)
EOSINOPHIL # BLD AUTO: 0.16 THOUSAND/ΜL (ref 0–0.61)
EOSINOPHIL NFR BLD AUTO: 3 % (ref 0–6)
ERYTHROCYTE [DISTWIDTH] IN BLOOD BY AUTOMATED COUNT: 12.9 % (ref 11.6–15.1)
GFR SERPL CREATININE-BSD FRML MDRD: 112 ML/MIN/1.73SQ M
GLUCOSE P FAST SERPL-MCNC: 74 MG/DL (ref 65–99)
HCT VFR BLD AUTO: 42.2 % (ref 34.8–46.1)
HDLC SERPL-MCNC: 80 MG/DL
HGB BLD-MCNC: 13.8 G/DL (ref 11.5–15.4)
IMM GRANULOCYTES # BLD AUTO: 0.02 THOUSAND/UL (ref 0–0.2)
IMM GRANULOCYTES NFR BLD AUTO: 0 % (ref 0–2)
LDLC SERPL CALC-MCNC: 75 MG/DL (ref 0–100)
LYMPHOCYTES # BLD AUTO: 1.12 THOUSANDS/ΜL (ref 0.6–4.47)
LYMPHOCYTES NFR BLD AUTO: 23 % (ref 14–44)
MCH RBC QN AUTO: 29.1 PG (ref 26.8–34.3)
MCHC RBC AUTO-ENTMCNC: 32.7 G/DL (ref 31.4–37.4)
MCV RBC AUTO: 89 FL (ref 82–98)
MONOCYTES # BLD AUTO: 0.58 THOUSAND/ΜL (ref 0.17–1.22)
MONOCYTES NFR BLD AUTO: 12 % (ref 4–12)
NEUTROPHILS # BLD AUTO: 3 THOUSANDS/ΜL (ref 1.85–7.62)
NEUTS SEG NFR BLD AUTO: 61 % (ref 43–75)
NONHDLC SERPL-MCNC: 84 MG/DL
NRBC BLD AUTO-RTO: 0 /100 WBCS
PLATELET # BLD AUTO: 233 THOUSANDS/UL (ref 149–390)
PMV BLD AUTO: 11.3 FL (ref 8.9–12.7)
POTASSIUM SERPL-SCNC: 4.9 MMOL/L (ref 3.5–5.3)
PROT SERPL-MCNC: 7 G/DL (ref 6.4–8.2)
RBC # BLD AUTO: 4.74 MILLION/UL (ref 3.81–5.12)
SODIUM SERPL-SCNC: 141 MMOL/L (ref 136–145)
TRIGL SERPL-MCNC: 47 MG/DL
WBC # BLD AUTO: 4.91 THOUSAND/UL (ref 4.31–10.16)

## 2019-12-12 PROCEDURE — 80053 COMPREHEN METABOLIC PANEL: CPT

## 2019-12-12 PROCEDURE — 85025 COMPLETE CBC W/AUTO DIFF WBC: CPT

## 2019-12-12 PROCEDURE — 82306 VITAMIN D 25 HYDROXY: CPT

## 2019-12-12 PROCEDURE — 80061 LIPID PANEL: CPT

## 2019-12-12 PROCEDURE — 36415 COLL VENOUS BLD VENIPUNCTURE: CPT

## 2020-01-09 ENCOUNTER — TELEPHONE (OUTPATIENT)
Dept: OBGYN CLINIC | Facility: CLINIC | Age: 35
End: 2020-01-09

## 2020-01-09 ENCOUNTER — OFFICE VISIT (OUTPATIENT)
Dept: OBGYN CLINIC | Facility: CLINIC | Age: 35
End: 2020-01-09
Payer: COMMERCIAL

## 2020-01-09 VITALS
WEIGHT: 159 LBS | SYSTOLIC BLOOD PRESSURE: 112 MMHG | BODY MASS INDEX: 25.55 KG/M2 | HEIGHT: 66 IN | DIASTOLIC BLOOD PRESSURE: 80 MMHG

## 2020-01-09 DIAGNOSIS — N81.10 VAGINAL WALL PROLAPSE: Primary | ICD-10-CM

## 2020-01-09 DIAGNOSIS — N39.46 MIXED STRESS AND URGE URINARY INCONTINENCE: ICD-10-CM

## 2020-01-09 PROBLEM — M65.4 DE QUERVAIN'S TENOSYNOVITIS, LEFT: Status: ACTIVE | Noted: 2017-11-03

## 2020-01-09 PROBLEM — Z34.93 NORMAL PREGNANCY IN THIRD TRIMESTER: Status: ACTIVE | Noted: 2017-02-20

## 2020-01-09 PROCEDURE — 99213 OFFICE O/P EST LOW 20 MIN: CPT | Performed by: PHYSICIAN ASSISTANT

## 2020-01-09 NOTE — ASSESSMENT & PLAN NOTE
Reviewed with patient slight vaginal wall prolapse today  Reviewed importance of kegel exercises to help with stress and urge incontinence  Can consider pelvic floor physical therapy  Script given

## 2020-01-09 NOTE — PROGRESS NOTES
Assessment/Plan:    Vaginal wall prolapse  Reviewed with patient slight vaginal wall prolapse today  Reviewed importance of kegel exercises to help with stress and urge incontinence  Can consider pelvic floor physical therapy  Script given  Problem List Items Addressed This Visit        Genitourinary    Vaginal wall prolapse - Primary     Reviewed with patient slight vaginal wall prolapse today  Reviewed importance of kegel exercises to help with stress and urge incontinence  Can consider pelvic floor physical therapy  Script given  Relevant Orders    Ambulatory referral to Physical Therapy       Other    Mixed stress and urge urinary incontinence    Relevant Orders    Ambulatory referral to Physical Therapy            Subjective:      Patient ID: Elizabeth Nelson is a 37 y o  female  HPI  38 yo seen for suspected prolapse  Patient reports was wiping yesterday and felt abnormal  When looked with mirror saw tissue sticking out of vagina, described as folds of tissue  Hx of 3 vaginal deliveries, youngest is almost 1years old  Reports has noticed more over the last year that she cannot jump without incontinence, also with coughing, sneezing, and when she has the urge needs to go right away  Some constipation recently but thinks it may be more the foods she was eating over the holidays  Denies pain with intercourse  No pelvic pain  The following portions of the patient's history were reviewed and updated as appropriate:   She  has a past medical history of Abnormal Pap smear of cervix, GERD (gastroesophageal reflux disease), Migraine, Raynaud's disease, and Varicella    She   Patient Active Problem List    Diagnosis Date Noted    Vaginal wall prolapse 2020    Mixed stress and urge urinary incontinence 2020    De Quervain's tenosynovitis, left 2017    Encounter for female sterilization procedure 2017     (spontaneous vaginal delivery) 2017    Normal pregnancy in third trimester 02/20/2017    Raynaud phenomenon 11/21/2016     She  has a past surgical history that includes TONSILECTOMY AND ADNOIDECTOMY; Colposcopy; and LAPAROSCOPY (Bilateral, 6/8/2017)  Her family history includes Arthritis in her father and paternal grandfather; Cancer in her maternal grandmother, paternal grandfather, and paternal grandmother; Diabetes in her maternal grandfather; Heart disease in her paternal grandfather; Hypertension in her father; No Known Problems in her mother  She  reports that she has never smoked  She has never used smokeless tobacco  She reports that she drinks alcohol  She reports that she does not use drugs  Current Outpatient Medications   Medication Sig Dispense Refill    ibuprofen (MOTRIN) 600 mg tablet Take 1 tablet by mouth every 6 (six) hours as needed for mild pain 30 tablet 0     No current facility-administered medications for this visit  She is allergic to cefaclor and cat hair extract       Review of Systems   Constitutional: Negative for fatigue, fever and unexpected weight change  HENT: Negative for dental problem and sinus pressure  Eyes: Negative for visual disturbance  Respiratory: Negative for cough, shortness of breath and wheezing  Cardiovascular: Negative for chest pain  Gastrointestinal: Negative for abdominal pain, blood in stool, constipation, diarrhea, nausea and vomiting  Endocrine: Negative for polydipsia  Genitourinary: Negative for difficulty urinating, dyspareunia, dysuria, frequency, hematuria, pelvic pain and urgency  Musculoskeletal: Negative for arthralgias and back pain  Neurological: Negative for dizziness, seizures, light-headedness and headaches  Psychiatric/Behavioral: Negative for suicidal ideas  The patient is not nervous/anxious            Objective:      /80 (BP Location: Left arm, Patient Position: Sitting, Cuff Size: Standard)   Ht 5' 6" (1 676 m)   Wt 72 1 kg (159 lb)   LMP 12/10/2019 (Approximate)   BMI 25 66 kg/m²          Physical Exam   Constitutional: She is oriented to person, place, and time  She appears well-developed and well-nourished  Neck: Neck supple  No thyroid mass and no thyromegaly present  Cardiovascular: Normal rate, regular rhythm and normal heart sounds  Exam reveals no gallop and no friction rub  No murmur heard  Pulmonary/Chest: Effort normal and breath sounds normal  No respiratory distress  She has no wheezes  She has no rales  Abdominal: Soft  Normal appearance and bowel sounds are normal  She exhibits no distension and no mass  There is no tenderness  There is no rebound and no guarding  Genitourinary: No breast tenderness, discharge or bleeding  There is no rash, tenderness, lesion or injury on the right labia  There is no rash, tenderness, lesion or injury on the left labia  Uterus is not deviated, not enlarged and not tender  Cervix exhibits no motion tenderness, no discharge and no friability  Right adnexum displays no mass, no tenderness and no fullness  Left adnexum displays no mass, no tenderness and no fullness  No erythema, tenderness or bleeding in the vagina  No signs of injury around the vagina  No vaginal discharge found  Genitourinary Comments: Slight vaginal wall prolapse, no uterine prolapse on today's exam     Lymphadenopathy:     She has no cervical adenopathy  Right: No inguinal and no supraclavicular adenopathy present  Left: No inguinal and no supraclavicular adenopathy present  Neurological: She is alert and oriented to person, place, and time  Skin: Skin is warm and dry  No rash noted  No erythema  No pallor  Psychiatric: She has a normal mood and affect   Her behavior is normal  Judgment and thought content normal

## 2020-02-25 ENCOUNTER — ANNUAL EXAM (OUTPATIENT)
Dept: OBGYN CLINIC | Facility: CLINIC | Age: 35
End: 2020-02-25
Payer: COMMERCIAL

## 2020-02-25 VITALS
DIASTOLIC BLOOD PRESSURE: 76 MMHG | WEIGHT: 163.6 LBS | BODY MASS INDEX: 26.29 KG/M2 | SYSTOLIC BLOOD PRESSURE: 114 MMHG | HEIGHT: 66 IN

## 2020-02-25 DIAGNOSIS — Z01.419 WOMEN'S ANNUAL ROUTINE GYNECOLOGICAL EXAMINATION: Primary | ICD-10-CM

## 2020-02-25 PROBLEM — Z30.2 ENCOUNTER FOR FEMALE STERILIZATION PROCEDURE: Status: RESOLVED | Noted: 2017-06-02 | Resolved: 2020-02-25

## 2020-02-25 PROBLEM — Z34.93 NORMAL PREGNANCY IN THIRD TRIMESTER: Status: RESOLVED | Noted: 2017-02-20 | Resolved: 2020-02-25

## 2020-02-25 PROCEDURE — 87624 HPV HI-RISK TYP POOLED RSLT: CPT | Performed by: NURSE PRACTITIONER

## 2020-02-25 PROCEDURE — S0612 ANNUAL GYNECOLOGICAL EXAMINA: HCPCS | Performed by: NURSE PRACTITIONER

## 2020-02-25 PROCEDURE — G0145 SCR C/V CYTO,THINLAYER,RESCR: HCPCS | Performed by: NURSE PRACTITIONER

## 2020-02-25 RX ORDER — MELATONIN
5000 DAILY
COMMUNITY

## 2020-02-25 NOTE — PROGRESS NOTES
Eileen Ramires is a 38 y o  female who presents for annual well woman exam  Last pap smear 16- NILM  Pap smear collected today  Periods are regular every 28-30 days, lasting 4-5 days  No intermenstrual bleeding, spotting, or discharge  Current contraception: tubal ligation  History of abnormal Pap smear: yes - 10-15 years ago   Family history of uterine or ovarian cancer: no  Regular self breast exam: yes  History of abnormal mammogram:  Mammograms to begin at age 34 unless otherwise clinically indicated  Family history of breast cancer: no  History of abnormal lipids: no  Gardasil:  No    Menstrual History:  OB History        3    Para   3    Term   3            AB        Living   3       SAB        TAB        Ectopic        Multiple   0    Live Births   1                Menarche age: 4  Patient's last menstrual period was 02/10/2020 (approximate)  Period Cycle (Days): (monthly )  Period Duration (Days): 4-5  Period Pattern: Regular  Menstrual Flow: Heavy, Moderate, Light  Dysmenorrhea: None    The following portions of the patient's history were reviewed and updated as appropriate: allergies, current medications, past family history, past medical history, past social history, past surgical history and problem list     Review of Systems  Pertinent items are noted in HPI        Objective      /76 (BP Location: Left arm, Patient Position: Sitting, Cuff Size: Standard)   Ht 5' 6" (1 676 m)   Wt 74 2 kg (163 lb 9 6 oz)   LMP 02/10/2020 (Approximate)   BMI 26 41 kg/m²     General:   alert and oriented, in no acute distress, alert, appears stated age and cooperative   Heart: regular rate and rhythm, S1, S2 normal, no murmur, click, rub or gallop   Lungs: clear to auscultation bilaterally   Abdomen: soft, non-tender, without masses or organomegaly, nondistended and normal bowel sounds   Vulva: normal, Bartholin's, Urethra, [place]'s normal, female escutcheon   Vagina: normal mucosa, normal discharge, no palpable nodules   Cervix: no bleeding following Pap, no cervical motion tenderness and no lesions   Uterus: normal size, non-tender, normal shape and consistency   Adnexa: normal adnexa and no mass, fullness, tenderness   Breast:  Nontender, no palpable masses, no nipple discharge, no skin changes bilaterally          Assessment      @well woman@   Plan      All questions answered  Await pap smear results  Breast self exam technique reviewed and patient encouraged to perform self-exam monthly  Contraception: tubal ligation  Diagnosis explained in detail, including differential   Dietary diary  Discussed healthy lifestyle modifications  Educational material distributed  Follow up in 1 Year for annual exam   Follow up as needed  Thin prep Pap smear  Breast awareness reviewed    Encouraged healthy diet, lifestyle and exercise regimen  Encouraged follow-up with PCP as needed  Reviewed recommendations for Gardasil vaccine up to age 39  Patient declines  Will call with results  VBI-   BMI Counseling: Body mass index is 26 41 kg/m²  The BMI is above normal  Nutrition recommendations include reducing portion sizes, decreasing overall calorie intake and 3-5 servings of fruits/vegetables daily  Exercise recommendations include moderate aerobic physical activity for 150 minutes/week, vigorous aerobic physical activity for 75 minutes/week, exercising 3-5 times per week and strength training exercises

## 2020-02-25 NOTE — PATIENT INSTRUCTIONS
Breast Self Exam for Women   WHAT YOU NEED TO KNOW:   What is a breast self-exam (BSE)? A BSE is a way to check your breasts for lumps and other changes  Regular BSEs can help you know how your breasts normally look and feel  Most breast lumps or changes are not cancer, but you should always have them checked by a healthcare provider  Your healthcare provider can also watch you do a BSE and can tell you if you are doing your BSE correctly  Why should I do a BSE? Breast cancer is the most common type of cancer in women  Even if you have mammograms, you may still want to do a BSE regularly  If you know how your breasts normally feel and look, it may help you know when to contact your healthcare provider  Mammograms can miss some cancers  You may find a lump during a BSE that did not show up on your mammogram   When should I do a BSE? Demarco your calendar to help you remember to do BSE on a regular schedule  One easy way to remember to do a BSE is to do the exam on the same day of each month  If you have periods, you may want to do your BSE 1 week after your period ends  This is the time when your breasts may be the least swollen, lumpy, or tender  You can do regular BSEs even if you are breastfeeding or have breast implants  How should I do a BSE? · Look at your breasts in a mirror  Look at the size and shape of each breast and nipple  Check for swelling, lumps, dimpling, scaly skin, or other skin changes  Look for nipple changes, such as a nipple that is painful or beginning to pull inward  Gently squeeze both nipples and check to see if fluid (that is not breast milk) comes out of them  If you find any of these or other breast changes, contact your healthcare provider  Check your breasts while you sit or  the following 3 positions:    [facility] your arms down at your sides  ¨ Raise your hands and join them behind your head  ¨ Put firm pressure with your hands on your hips   Bend slightly forward while you look at your breasts in the mirror  · Lie down and feel your breasts  When you lie down, your breast tissue spreads out evenly over your chest  This makes it easier for you to feel for lumps and anything that may not be normal for your breasts  Do a BSE on one breast at a time  ¨ Place a small pillow or towel under your left shoulder  Put your left arm behind your head  ¨ Use the 3 middle fingers of your right hand  Use your fingertip pads, on the top of your fingers  Your fingertip pad is the most sensitive part of your finger  ¨ Use small circles to feel your breast tissue  Use your fingertip pads to make dime-sized, overlapping circles on your breast and armpits  Use light, medium, and firm pressure  First, press lightly  Second, press with medium pressure to feel a little deeper into the breast  Last, use firm pressure to feel deep within your breast     ¨ Examine your entire breast area  Examine the breast area from above the breast to below the breast where you feel only ribs  Make small circles with your fingertips, starting in the middle of your armpit  Make circles going up and down the breast area  Continue toward your breast and all the way across it  Examine the area from your armpit all the way over to the middle of your chest (breastbone)  Stop at the middle of your chest     ¨ Move the pillow or towel to your right shoulder, and put your right arm behind your head  Use the 3 fingertip pads of your left hand, and repeat the above steps to do a BSE on your right breast        What else can I do to check for breast problems or cancer? Some experts suggest that women 36years of age or older should have a mammogram every year  Other experts suggest that women between the ages of 48and 76years old should have a mammogram every 2 years  Talk to your healthcare provider about when you should have a mammogram   When should I contact my healthcare provider?    · You find any lumps or changes in your breasts  · You have breast pain or fluid coming from your nipples  · You have questions or concerns or concerns about your condition or care  CARE AGREEMENT:   You have the right to help plan your care  Learn about your health condition and how it may be treated  Discuss treatment options with your caregivers to decide what care you want to receive  You always have the right to refuse treatment  The above information is an  only  It is not intended as medical advice for individual conditions or treatments  Talk to your doctor, nurse or pharmacist before following any medical regimen to see if it is safe and effective for you  © 2017 [address] Information is for End User's use only and may not be sold, redistributed or otherwise used for commercial purposes  All illustrations and images included in CareNotes® are the copyrighted property of A D A M , Inc  or [employer]  Pap Smear   GENERAL INFORMATION:   What is a Pap smear? A Pap smear, or Pap test, is a procedure to check your cervix for abnormal cells  The cervix is the narrow opening at the bottom of your uterus  The cervix meets the top part of the vagina  How do I prepare for a Pap smear? The best time to schedule the test is right after your period stops  Do not have a Pap smear during your monthly period  Do not have intercourse or put anything in your vagina for 24 hours before your test    What will happen during a Pap smear? · You will lie on your back and place your feet on footrests called stirrups  Your caregiver will gently insert a device called a speculum into your vagina  The speculum is used to spread the walls of your vagina so he can see your cervix  He will use a thin brush or cotton swab to collect cells from the inside of your cervix  · Your caregiver will also collect cells from the surface of your cervix with a plastic or wooden tool called a spatula   He may also gently scrape the upper part of your vagina for a sample  The samples are placed in a container with liquid or on a glass slide  They are sent to a lab and examined for abnormal cells  How often do I need a Pap smear? Pap smears are usually done every 1 to 3 years  You may need a Pap smear more often if you have any of the following:  · Positive test result for the human papillomavirus (HPV)    · Cervical intraepithelial neoplasm or cervical cancer    · HIV    · A weak immune system    · Exposure to diethylstilbestrol (LEÓN) medicine when your mother was pregnant with you  CARE AGREEMENT:   You have the right to help plan your care  Learn about your health condition and how it may be treated  Discuss treatment options with your caregivers to decide what care you want to receive  You always have the right to refuse treatment  The above information is an  only  It is not intended as medical advice for individual conditions or treatments  Talk to your doctor, nurse or pharmacist before following any medical regimen to see if it is safe and effective for you  © 2014 [address] is for End User's use only and may not be sold, redistributed or otherwise used for commercial purposes  All illustrations and images included in CareNotes® are the copyrighted property of A D A [employer]  or [employer]

## 2020-02-26 LAB
HPV HR 12 DNA CVX QL NAA+PROBE: POSITIVE
HPV16 DNA CVX QL NAA+PROBE: NEGATIVE
HPV18 DNA CVX QL NAA+PROBE: NEGATIVE

## 2020-03-02 LAB
LAB AP GYN PRIMARY INTERPRETATION: NORMAL
Lab: NORMAL

## 2020-03-19 ENCOUNTER — AMB VIDEO VISIT (OUTPATIENT)
Dept: URGENT CARE | Facility: CLINIC | Age: 35
End: 2020-03-19

## 2020-03-19 DIAGNOSIS — B34.9 VIRAL INFECTION: Primary | ICD-10-CM

## 2020-03-19 NOTE — CARE ANYWHERE EVISITS
Visit Summary for RENÉE RODRIGES - Gender: Female - Date of Birth: [id number]  Date: [id number] - Duration: 6 minutes  Patient: [address] [facility]   [place]  Provider: Yuli Alford PA-C    Patient Contact Information  Address  [address]  [phone]    Visit Topics  Flu-Like Symptoms [Added By: Self - 2020-03-19]    Triage Questions   Have you had any international travel in the last 14 days? Answer [No]  Have you had any exposure to a known or expected Covid-19 patient in the last 14 days? Answer [No]  Do you have any immune system compromise or chronic lung disease? Answer [No]  Do you have any vulnerable family members in the home (infant, pregnant, cancer, elderly)? Answer [No]     Conversation Transcripts  [0A][0A] [Notification] You are connected with Yuli Alford PA-C, Urgent Care Specialist [0A][Notification] [facility] is located in [place]  [0A][Notification] [facility] has shared health history  Aristeo Penn  [0A]    Diagnosis    Procedures  Value: [id number] Code: CPT-4 UNLISTED E&M SERVICE    Medications Prescribed    No prescriptions ordered    Electronically signed by: Hannah Coronel(NPI [id number])

## 2020-03-19 NOTE — PROGRESS NOTES
TeleConsultation - Mindy Kussmaul 38 y o  female MRN: [id number]     Encounter: [id number]    REQUIRED DOCUMENTATION:     1  This service was provided via Telemedicine  2  Provider located at [facility]  3  TeleMed provider: Yuli Alford PA-C   4  Identify all parties in room with patient during tele consult:  Patient only  5  After connecting through [employer], patient was identified by name and date of birth and confirmed patient was located in [place]  Patient was then informed that this was a Telemedicine visit and that the exam was being conducted confidentially over secure lines  My office door was closed  No one else was in the room  Patient acknowledged consent and understanding of privacy and security of the Telemedicine visit  Patient presented the opportunity for them to ask any questions regarding the visit today  The patient agreed to participate  [address]  (office) [phone]  (fax) [phone]        NAME: Mindy Kussmaul is a 38 y o  female  : 1985    MRN: [id number]  DATE: 2020  TIME: 1:07 PM    Assessment and Plan   Viral infection [B34 9]  1  Viral infection         Patient Instructions   Infection appears viral   Recommend symptomatic treatment  Can take ibuprofen or tylenol as needed for pain or fever  Over the counter cough and cold medications to help with symptoms  Use salt water gargles for sore throat and throat lozenges  Cough drops as needed  Wash hands frequently to prevent the spread of infection  If not improving over the next 7-10 days, follow up with PCP  Symptoms may persist for 10-14 days  To present to the ER if symptoms worsen  Chief Complaint     Chief Complaint   Patient presents with    Fever     reports fever of 99 1F today          History of Present Illness   Mindy Kussmaul presents to the video visit c/o    Fever   This is a new problem   The current episode started in the past 7 days  The problem occurs intermittently  The problem has been unchanged  Associated symptoms include congestion (occassional (reports seasonal allergies)) and a fever (tmax 99  1F per patient)  Pertinent negatives include no abdominal pain, chest pain, chills, fatigue, headaches, myalgias, nausea, numbness, rash, sore throat, swollen glands, urinary symptoms or vomiting  Nothing aggravates the symptoms  She has tried nothing for the symptoms  The treatment provided no relief  Corona Virus screening:  -Have you had any internation travel in the last 14 days? n  -Have you had any exposure to a known or suspected COVID19 patient in the last 14 days? n  -Do you have any immune system compromise or chronic lung disease?n  Cough: NO  Fever: NO  SOB:NO  Review of Systems   Review of Systems   Constitutional: Positive for fever (tmax 99  1F per patient)  Negative for chills and fatigue  HENT: Positive for congestion (occassional (reports seasonal allergies))  Negative for sore throat  Cardiovascular: Negative for chest pain  Gastrointestinal: Negative for abdominal pain, nausea and vomiting  Musculoskeletal: Negative for myalgias  Skin: Negative for rash  Neurological: Negative for numbness and headaches           Current Medications     Long-Term Medications   Medication Sig Dispense Refill    cholecalciferol (VITAMIN D3) 1,000 units tablet Take 5,000 Units by mouth daily         Current Allergies     Allergies as of 03/19/2020 - Reviewed 02/25/2020   Allergen Reaction Noted    Cefaclor Rash 09/21/2016    Cat hair extract Other (See Comments)             The following portions of the patient's history were reviewed and updated as appropriate: allergies, current medications, past family history, past medical history, past social history, past surgical history and problem list   Past Medical History:   Diagnosis Date    Abnormal Pap smear of cervix     GERD (gastroesophageal reflux disease)     Migraine  Raynaud's disease     Varicella     childhood     Past Surgical History:   Procedure Laterality Date    COLPOSCOPY      LAPAROSCOPY Bilateral 6/8/2017    Procedure: SILS LAPAROSCOPIC SALPINGECTOMY ;  Surgeon: Lesa Schmitz MD;  Location: [facility];  Service:    [place] SALPINGECTOMY Bilateral     TONSILECTOMY AND ADNOIDECTOMY       Social History     Socioeconomic History    Marital status: /Civil Union     Spouse name: Not on file    Number of children: Not on file    Years of education: Not on file    Highest education level: Not on file   Occupational History    Not on file   Social Needs    Financial resource strain: Not on file    Food insecurity:     Worry: Not on file     Inability: Not on file    Transportation needs:     Medical: Not on file     Non-medical: Not on file   Tobacco Use    Smoking status: Never Smoker    Smokeless tobacco: Never Used   Substance and Sexual Activity    Alcohol use: Yes     Frequency: Monthly or less     Drinks per session: 1 or 2     Binge frequency: Less than monthly     Comment: socially     Drug use: No    Sexual activity: Yes     Partners: Male     Birth control/protection: Female Sterilization   Lifestyle    Physical activity:     Days per week: Not on file     Minutes per session: Not on file    Stress: Not on file   Relationships    Social connections:     Talks on phone: Not on file     Gets together: Not on file     Attends Sikhism service: Not on file     Active member of club or organization: Not on file     Attends meetings of clubs or organizations: Not on file     Relationship status: Not on file    Intimate partner violence:     Fear of current or ex partner: Not on file     Emotionally abused: Not on file     Physically abused: Not on file     Forced sexual activity: Not on file   Other Topics Concern    Not on file   Social History Narrative    Not on file       Objective   There were no vitals taken for this visit    video visit- per patient no fevers current temp 99 1F (without antipyretic)   Physical Exam     Physical Exam   Constitutional: She appears well-developed and well-nourished  No distress  HENT:   Right Ear: External ear normal    Left Ear: External ear normal    Eyes: Conjunctivae are normal  Right eye exhibits no discharge  Left eye exhibits no discharge  Skin: She is not diaphoretic       Video visit-NAD, no coughing during visit  Zuri Enriquez PA-C

## 2020-04-08 ENCOUNTER — TELEPHONE (OUTPATIENT)
Dept: PHYSICAL THERAPY | Facility: REHABILITATION | Age: 35
End: 2020-04-08

## 2020-06-03 ENCOUNTER — EVALUATION (OUTPATIENT)
Dept: PHYSICAL THERAPY | Facility: REHABILITATION | Age: 35
End: 2020-06-03
Payer: COMMERCIAL

## 2020-06-03 DIAGNOSIS — N81.89 PELVIC FLOOR WEAKNESS: Primary | ICD-10-CM

## 2020-06-03 DIAGNOSIS — N39.3 SUI (STRESS URINARY INCONTINENCE, FEMALE): ICD-10-CM

## 2020-06-03 PROCEDURE — 97162 PT EVAL MOD COMPLEX 30 MIN: CPT | Performed by: PHYSICAL THERAPIST

## 2020-06-03 PROCEDURE — 97530 THERAPEUTIC ACTIVITIES: CPT | Performed by: PHYSICAL THERAPIST

## 2020-06-10 ENCOUNTER — OFFICE VISIT (OUTPATIENT)
Dept: PHYSICAL THERAPY | Facility: REHABILITATION | Age: 35
End: 2020-06-10
Payer: COMMERCIAL

## 2020-06-10 DIAGNOSIS — N81.89 PELVIC FLOOR WEAKNESS: Primary | ICD-10-CM

## 2020-06-10 DIAGNOSIS — N39.3 SUI (STRESS URINARY INCONTINENCE, FEMALE): ICD-10-CM

## 2020-06-10 PROCEDURE — 90912 BFB TRAINING 1ST 15 MIN: CPT | Performed by: PHYSICAL THERAPIST

## 2020-06-10 PROCEDURE — 90913 BFB TRAINING EA ADDL 15 MIN: CPT | Performed by: PHYSICAL THERAPIST

## 2020-06-10 PROCEDURE — 97112 NEUROMUSCULAR REEDUCATION: CPT | Performed by: PHYSICAL THERAPIST

## 2020-06-17 ENCOUNTER — OFFICE VISIT (OUTPATIENT)
Dept: PHYSICAL THERAPY | Facility: REHABILITATION | Age: 35
End: 2020-06-17
Payer: COMMERCIAL

## 2020-06-17 DIAGNOSIS — N39.3 SUI (STRESS URINARY INCONTINENCE, FEMALE): ICD-10-CM

## 2020-06-17 DIAGNOSIS — N81.89 PELVIC FLOOR WEAKNESS: Primary | ICD-10-CM

## 2020-06-17 PROCEDURE — 97110 THERAPEUTIC EXERCISES: CPT | Performed by: PHYSICAL THERAPIST

## 2020-06-17 PROCEDURE — 97112 NEUROMUSCULAR REEDUCATION: CPT | Performed by: PHYSICAL THERAPIST

## 2020-06-24 ENCOUNTER — OFFICE VISIT (OUTPATIENT)
Dept: PHYSICAL THERAPY | Facility: REHABILITATION | Age: 35
End: 2020-06-24
Payer: COMMERCIAL

## 2020-06-24 DIAGNOSIS — N81.89 PELVIC FLOOR WEAKNESS: Primary | ICD-10-CM

## 2020-06-24 DIAGNOSIS — N39.3 SUI (STRESS URINARY INCONTINENCE, FEMALE): ICD-10-CM

## 2020-06-24 PROCEDURE — 97110 THERAPEUTIC EXERCISES: CPT | Performed by: PHYSICAL THERAPIST

## 2020-06-24 PROCEDURE — 97112 NEUROMUSCULAR REEDUCATION: CPT | Performed by: PHYSICAL THERAPIST

## 2020-06-24 PROCEDURE — 97530 THERAPEUTIC ACTIVITIES: CPT | Performed by: PHYSICAL THERAPIST

## 2020-06-29 ENCOUNTER — OFFICE VISIT (OUTPATIENT)
Dept: PHYSICAL THERAPY | Facility: REHABILITATION | Age: 35
End: 2020-06-29
Payer: COMMERCIAL

## 2020-06-29 DIAGNOSIS — N81.89 PELVIC FLOOR WEAKNESS: Primary | ICD-10-CM

## 2020-06-29 DIAGNOSIS — N39.3 SUI (STRESS URINARY INCONTINENCE, FEMALE): ICD-10-CM

## 2020-06-29 PROCEDURE — 97110 THERAPEUTIC EXERCISES: CPT | Performed by: PHYSICAL THERAPIST

## 2020-07-20 NOTE — PROGRESS NOTES
Daily Note     Today's date: 2020  Patient name: Joe Li  : 1985  MRN: [id number]  Referring provider: CASPER Taveras*  Dx:   Encounter Diagnosis     ICD-10-CM    1  Pelvic floor weakness N81 89    2  DULCE (stress urinary incontinence, female) N39 3      From evaluation: Patient presents with sensation of pressure and heaviness in the vaginal region which was about 5-6 months ago  She notes that "it" is present on and off  She reports that she urine leakage with cough and sneeze and trampoline jumping  Mechanism of injury: childbirth                 Subjective: Patient reports that she continues to do a lot of cleaning around her house and she has been feling fairly well  Reports compliant with HEP 1-2 days/week  She has been doing her run/walk program     She has begun to train on the treadmill for 30 ' She is doing run/walk program 6 days/week  Objective: See treatment diary below      Assessment: Tolerated treatment well  Patient demonstrated fatigue post treatment, exhibited good technique with therapeutic exercises and would benefit from continued PT      HEP: isolated PFM x10, add, abd x10 each - 2x/day  20: islated PFM + breath 5', 2x/day; hip adduction and abduction with PFM and breath focus x 5', 2x/day  20: seated march x 10, bridges 10"x10 - 1-2/day  20: S/L bridge x 10, SLR 5x10", jumps    Plan: Continue per plan of care        Precautions: standard      Manuals 6/3 6/10 6/17 6/24 6/29                                                            Neuro Re-Ed             DB   20'          PFM + DB   10'          Postural ed and crate lifting    10'                                                             Ther Ex             Hip add vs bolster  PFM first 5"x10 "exhale, kegel,knees" x10 x10 hep        Hip abd vs BTB  PFM first 5:x10 "exhale, kegel,knees" x10 x10 hep        sidestepping   W/BTB x 4' x4 x4        nustep    L4x8' L5x8'        PFM + bridge    10"x10 10"x10        seted forward lean + PFM + march    x10 x20        SLR     5x10 each        Single leg bridge     x10 each        Jump - straight knee     x5        Jump - squat                          Ther Activity             PFM anatomy education with model 10'            Bowel education    13'         Gait Training                                       Modalities             biofeedback nv 36'

## 2020-07-21 ENCOUNTER — APPOINTMENT (OUTPATIENT)
Dept: PHYSICAL THERAPY | Facility: REHABILITATION | Age: 35
End: 2020-07-21
Payer: COMMERCIAL

## 2020-07-27 NOTE — PROGRESS NOTES
Daily Note/ Discharge     Today's date: 2020  Patient name: Shruthi Antonio  : 1985  MRN: [id number]  Referring provider: CASPER Lancaster*  Dx:   Encounter Diagnosis     ICD-10-CM    1  Pelvic floor weakness N81 89    2  DULCE (stress urinary incontinence, female) N39 3      From evaluation: Patient presents with sensation of pressure and heaviness in the vaginal region which was about 5-6 months ago  She notes that "it" is present on and off  She reports that she urine leakage with cough and sneeze and trampoline jumping  Mechanism of injury: childbirth                 Subjective: Patient reports that she yelled at her kids last week and felt a slight pelvic heaviness afterwards which quickly resolved  She has been exercising for 30 minutes every day and notes compliance with her HEP  She does have mild HS cramping with with SL bridges  Reports that 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 and had meetings about it today  She feels tension throughout her body  Objective: See treatment diary below      Assessment: Tolerated treatment well  Patient exhibited good technique with therapeutic exercises  Her biofeedback measures were much improved with work phase - from 2  5uV on 6/10/20 to 23  3uV today  She does present with increased resting tone which we agreed is likely due to her emotional stress level  We discussed that it is important to identify when this happens and try some relaxation strategies to mitigate the stress effect on her system  For self progression, we discussed doing HEP 3 times a week for the rest of this month  In addition, she is to begin adding breathing and PFM focus with weight lifting and other exercises  She expressed good understanding and is appropriate for discharge       HEP: isolated PFM x10, add, abd x10 each - 2x/day  20: islated PFM + breath 5', 2x/day; hip adduction and abduction with PFM and breath focus x 5', 2x/day  20: seated march x 10, bridges 10"x10 - 1-2/day  6/29/20: S/L bridge x 10, SLR 5x10", jumps    Goals from evaluation:   STGs to be met in 3 weeks:  * Patient will be compliant with introductory HEP as prescribed  MET  * Patient presents with good understanding of pelvic floor protective strategies to reduce intra-abdominal pressure against pelvic organs  MET    LTGs to be met by discharge:  * Patient will present with a  50% improvement on FOTO score by discharge to indicate improved symptom management  MET  * Normalize sEMG findings to indicate strength average > 12uV and resting average < 2 0uV  MET FOR STRENGTH (23 3 UV)  * Demonstrates correct isolation and relaxation of pelvic floor to palpation without overflow from global stabilizers  MET  * Reports that she/he can cough/laugh/sneeze with at least 75% less bladder leakage  MET  * Patient will use pelvic floor muscles correctly during functional ADLs such as coughing, sneezing, lifting and exercise activities to avoid excessive IAP and PFM strain  MET  * Patient will be compliant with comprehensive home exercise program for self management of condition  MET    Plan: Discharge PT        Precautions: standard      Manuals 6/3 6/10 6/17 6/24 6/29 7/28                                                           Neuro Re-Ed             DB   20'          PFM + DB   10'          Postural ed and crate lifting    10'         Relaxation breathing for stress mitigation      10'                                              Ther Ex             Hip add vs bolster  PFM first 5"x10 "exhale, kegel,knees" x10 x10 hep        Hip abd vs BTB  PFM first 5:x10 "exhale, kegel,knees" x10 x10 hep        sidestepping   W/BTB x 4' x4 x4        nustep    L4x8' L5x8'        PFM + bridge    10"x10 10"x10 10"x10       seted forward lean + PFM + march    x10 x20 x10       SLR     5x10 each        Single leg bridge     x10 each x5 reviewed technique to avoid cramping       Jump - straight knee     x5 Jump - squat                          Ther Activity             PFM anatomy education with model 10'            Bowel education    13'         Education re: self management upon discharge      13'                    Gait Training                                       Modalities             biofeedback nv 36'    23'

## 2020-07-28 ENCOUNTER — OFFICE VISIT (OUTPATIENT)
Dept: PHYSICAL THERAPY | Facility: REHABILITATION | Age: 35
End: 2020-07-28
Payer: COMMERCIAL

## 2020-07-28 DIAGNOSIS — N39.3 SUI (STRESS URINARY INCONTINENCE, FEMALE): ICD-10-CM

## 2020-07-28 DIAGNOSIS — N81.89 PELVIC FLOOR WEAKNESS: Primary | ICD-10-CM

## 2020-07-28 PROCEDURE — 97530 THERAPEUTIC ACTIVITIES: CPT | Performed by: PHYSICAL THERAPIST

## 2020-07-28 PROCEDURE — 97112 NEUROMUSCULAR REEDUCATION: CPT | Performed by: PHYSICAL THERAPIST

## 2020-07-28 PROCEDURE — 90913 BFB TRAINING EA ADDL 15 MIN: CPT | Performed by: PHYSICAL THERAPIST

## 2020-07-28 PROCEDURE — 97110 THERAPEUTIC EXERCISES: CPT | Performed by: PHYSICAL THERAPIST

## 2020-07-28 PROCEDURE — 90912 BFB TRAINING 1ST 15 MIN: CPT | Performed by: PHYSICAL THERAPIST

## 2021-11-18 ENCOUNTER — ANNUAL EXAM (OUTPATIENT)
Dept: OBGYN CLINIC | Facility: CLINIC | Age: 36
End: 2021-11-18
Payer: COMMERCIAL

## 2021-11-18 VITALS
HEIGHT: 66 IN | WEIGHT: 153.2 LBS | DIASTOLIC BLOOD PRESSURE: 74 MMHG | BODY MASS INDEX: 24.62 KG/M2 | SYSTOLIC BLOOD PRESSURE: 112 MMHG

## 2021-11-18 DIAGNOSIS — Z01.419 ENCOUNTER FOR GYNECOLOGICAL EXAMINATION (GENERAL) (ROUTINE) WITHOUT ABNORMAL FINDINGS: Primary | ICD-10-CM

## 2021-11-18 DIAGNOSIS — N81.10 VAGINAL WALL PROLAPSE: ICD-10-CM

## 2021-11-18 DIAGNOSIS — Z71.85 HPV VACCINE COUNSELING: ICD-10-CM

## 2021-11-18 PROCEDURE — G0476 HPV COMBO ASSAY CA SCREEN: HCPCS | Performed by: PHYSICIAN ASSISTANT

## 2021-11-18 PROCEDURE — S0612 ANNUAL GYNECOLOGICAL EXAMINA: HCPCS | Performed by: PHYSICIAN ASSISTANT

## 2021-11-18 PROCEDURE — G0145 SCR C/V CYTO,THINLAYER,RESCR: HCPCS | Performed by: PHYSICIAN ASSISTANT

## 2021-11-22 LAB
HPV HR 12 DNA CVX QL NAA+PROBE: NEGATIVE
HPV16 DNA CVX QL NAA+PROBE: NEGATIVE
HPV18 DNA CVX QL NAA+PROBE: NEGATIVE

## 2021-11-26 LAB
LAB AP GYN PRIMARY INTERPRETATION: NORMAL
LAB AP LMP: NORMAL
Lab: NORMAL

## 2023-11-06 ENCOUNTER — APPOINTMENT (OUTPATIENT)
Dept: URGENT CARE | Age: 38
End: 2023-11-06
Payer: COMMERCIAL

## 2023-11-06 ENCOUNTER — OFFICE VISIT (OUTPATIENT)
Dept: URGENT CARE | Age: 38
End: 2023-11-06
Payer: COMMERCIAL

## 2023-11-06 VITALS — OXYGEN SATURATION: 99 % | RESPIRATION RATE: 16 BRPM | HEART RATE: 100 BPM | TEMPERATURE: 97.7 F

## 2023-11-06 DIAGNOSIS — H66.001 NON-RECURRENT ACUTE SUPPURATIVE OTITIS MEDIA OF RIGHT EAR WITHOUT SPONTANEOUS RUPTURE OF TYMPANIC MEMBRANE: Primary | ICD-10-CM

## 2023-11-06 PROCEDURE — S9083 URGENT CARE CENTER GLOBAL: HCPCS

## 2023-11-06 PROCEDURE — G0382 LEV 3 HOSP TYPE B ED VISIT: HCPCS

## 2023-11-06 RX ORDER — AMOXICILLIN AND CLAVULANATE POTASSIUM 875; 125 MG/1; MG/1
1 TABLET, FILM COATED ORAL EVERY 12 HOURS SCHEDULED
Qty: 10 TABLET | Refills: 0 | Status: SHIPPED | OUTPATIENT
Start: 2023-11-06 | End: 2023-11-11

## 2023-11-07 NOTE — PROGRESS NOTES
[facility]        NAME: Orly Koroma is a 34 y.o. female  : 1985    MRN: [id number]  DATE: 2023  TIME: 8:27 PM    Assessment and Plan   Non-recurrent acute suppurative otitis media of right ear without spontaneous rupture of tympanic membrane [H66.001]  1. Non-recurrent acute suppurative otitis media of right ear without spontaneous rupture of tympanic membrane  amoxicillin-clavulanate (AUGMENTIN) 875-125 mg per tablet            Patient Instructions       Please take Augmentin 2x daily for the next 5 days. Alternate Ibuprofen and Tylenol as needed for pain. Ensure adequate fluid intake. Follow-up with primary care provider for persisting symptoms. Proceed to  ER if symptoms worsen. Chief Complaint   No chief complaint on file. History of Present Illness       68-year-old female presenting for evaluation of ear pain. Patient states that over the past "few days" she has been experiencing ear pain and sore throat on the right side. She states that has been taking Advil with mild relief of her symptoms. She denies any bleeding or drainage from her ear. She denies any fevers, chills, chest pain or shortness of breath. She states that her daughter was recently ill with similar symptoms. Review of Systems   Review of Systems   Constitutional:  Negative for chills and fever. HENT:  Positive for congestion, ear pain and sore throat. Negative for ear discharge and hearing loss. Respiratory:  Negative for cough and shortness of breath. Cardiovascular:  Negative for chest pain. Gastrointestinal:  Negative for diarrhea, nausea and vomiting. All other systems reviewed and are negative.         Current Medications       Current Outpatient Medications:     amoxicillin-clavulanate (AUGMENTIN) 875-125 mg per tablet, Take 1 tablet by mouth every 12 (twelve) hours for 5 days, Disp: 10 tablet, Rfl: 0    cholecalciferol (VITAMIN D3) 1,000 units tablet, Take 5,000 Units by mouth daily  , Disp: , Rfl:     Current Allergies     Allergies as of 11/06/2023 - Reviewed 11/06/2023   Allergen Reaction Noted    Cefaclor Rash 09/21/2016    Cat hair extract Other (See Comments)             The following portions of the patient's history were reviewed and updated as appropriate: allergies, current medications, past family history, past medical history, past social history, past surgical history and problem list.     Past Medical History:   Diagnosis Date    Abnormal Pap smear of cervix     GERD (gastroesophageal reflux disease)     High risk HPV infection     Migraine     Raynaud's disease     Varicella     childhood       Past Surgical History:   Procedure Laterality Date    COLPOSCOPY      LAPAROSCOPY Bilateral 6/8/2017    Procedure: SILS LAPAROSCOPIC SALPINGECTOMY ;  Surgeon: Chel Mckenzie MD;  Location: [facility];  Service:     SALPINGECTOMY Bilateral     TONSILECTOMY AND ADNOIDECTOMY         Family History   Problem Relation Age of Onset    Arthritis Father     Hypertension Father     Ovarian cancer Maternal Grandmother     Uterine cancer Maternal Grandmother     Diabetes Maternal Grandfather     Heart attack Maternal Grandfather     Aneurysm Paternal Grandmother     Arthritis Paternal Grandfather     Heart disease Paternal Grandfather     Colon cancer Paternal Grandfather     No Known Problems Mother     No Known Problems Sister     No Known Problems Daughter     No Known Problems Son     No Known Problems Daughter          Medications have been verified. Objective   Pulse 100   Temp 97.7 °F (36.5 °C)   Resp 16   SpO2 99%        Physical Exam     Physical Exam  Vitals and nursing note reviewed. Constitutional:       General: She is not in acute distress. Appearance: Normal appearance. She is normal weight. She is not ill-appearing, toxic-appearing or diaphoretic. HENT:      Head: Normocephalic and atraumatic.       Right Ear: Tympanic membrane is erythematous and bulging. Left Ear: Tympanic membrane normal.      Nose: Congestion present. No rhinorrhea. Mouth/Throat:      Mouth: Mucous membranes are moist.      Pharynx: Oropharynx is clear. Posterior oropharyngeal erythema present. No oropharyngeal exudate. Eyes:      General:         Right eye: No discharge. Left eye: No discharge. Cardiovascular:      Rate and Rhythm: Normal rate and regular rhythm. Pulses: Normal pulses. Heart sounds: Normal heart sounds. No murmur heard. No friction rub. No gallop. Pulmonary:      Effort: Pulmonary effort is normal. No respiratory distress. Breath sounds: Normal breath sounds. No stridor. No wheezing, rhonchi or rales. Chest:      Chest wall: No tenderness. Abdominal:      General: Bowel sounds are normal.      Palpations: Abdomen is soft. Tenderness: There is no abdominal tenderness. Skin:     General: Skin is warm and dry. Neurological:      Mental Status: She is alert.    Psychiatric:         Mood and Affect: Mood normal.         Behavior: Behavior normal.

## 2023-11-07 NOTE — PATIENT INSTRUCTIONS
Please take Augmentin 2x daily for the next 5 days. Alternate Ibuprofen and Tylenol as needed for pain. Ensure adequate fluid intake. Follow-up with primary care provider for persisting symptoms. 1.  Drink plenty fluids. 2.  Take probiotics [i.e. Yogurt, Acidophilus, Florastor (liquid)] daily. 3.  Over-the-counter medications as needed for symptomatic care. 4.   Advance activities as tolerated. 5.   Follow-up with your primary care physician in 3-4 days. 6.  Go to emergency room if symptoms are worsening.     7.  Use a humidifier at bedtime

## 2024-02-21 PROBLEM — Z01.419 ENCOUNTER FOR GYNECOLOGICAL EXAMINATION (GENERAL) (ROUTINE) WITHOUT ABNORMAL FINDINGS: Status: RESOLVED | Noted: 2021-11-18 | Resolved: 2024-02-21

## 2024-04-03 ENCOUNTER — ANNUAL EXAM (OUTPATIENT)
Dept: OBGYN CLINIC | Facility: CLINIC | Age: 39
End: 2024-04-03
Payer: COMMERCIAL

## 2024-04-03 VITALS
WEIGHT: 175 LBS | SYSTOLIC BLOOD PRESSURE: 116 MMHG | HEIGHT: 66 IN | DIASTOLIC BLOOD PRESSURE: 70 MMHG | BODY MASS INDEX: 28.12 KG/M2

## 2024-04-03 DIAGNOSIS — Z01.419 ROUTINE GYNECOLOGICAL EXAMINATION: Primary | ICD-10-CM

## 2024-04-03 DIAGNOSIS — N85.2 UTERINE ENLARGEMENT: ICD-10-CM

## 2024-04-03 DIAGNOSIS — Z12.31 ENCOUNTER FOR SCREENING MAMMOGRAM FOR MALIGNANT NEOPLASM OF BREAST: ICD-10-CM

## 2024-04-03 PROCEDURE — S0612 ANNUAL GYNECOLOGICAL EXAMINA: HCPCS | Performed by: PHYSICIAN ASSISTANT

## 2024-04-03 NOTE — PROGRESS NOTES
Assessment/Plan   Problem List Items Addressed This Visit    None  Visit Diagnoses       Routine gynecological examination    -  Primary    Encounter for screening mammogram for malignant neoplasm of breast        Relevant Orders    Mammo screening bilateral w 3d & cad    Uterine enlargement        Relevant Orders    US pelvis complete w transvaginal            Discussion    All questions have been answered to her satisfaction  RTO for APE or sooner if needed  Pap to be done next year.   Mammo ordered.   Will plan pelvis US due to uterine prominence on exam.       Subjective     HPI   Jeannine Hooks is a 39 y.o. female who presents for annual well woman exam.     LMP - 3/4/23 ; Periods are reg q 28  days and last  5 days; No excessive bleeding; No intermenstrual bleeding or spotting; Cramps are tolerable.    No vulvar itch/burn; No vaginal itch/burn; No abn discharge or odor; No urinary sx - burning/pain/frequency/hematuria    No concerning breast masses, asymmetry, nipple discharge or bleeding, changes in skin of breast, or breast tenderness bilaterally  Pt has seen pelvic floor PT previously. She notes she will have some DULCE. She also notes sometimes she will feel more full and has some pressure. She is aware of exercises from pelvic floor PT and plans to start those again.     Pt is sexually active in a mutually monog/ sexual relationship x 20 years; No issues with intercourse; She declines sti/hiv/hep testing; Feels safe at home  Current contraception: TL     (+) PCP for routine Bw/care;    Last Pap : 11/18/21 WNL neg HPV  History of abnormal Pap smear: 1 abnormal had colpo done approx 15 years ago  Mammo:NA      Review of Systems   Constitutional: Negative.    Respiratory: Negative.     Gastrointestinal: Negative.    Endocrine: Negative.    Genitourinary: Negative.        The following portions of the patient's history were reviewed and updated as appropriate: allergies, current medications, past family  history, past medical history, past social history, past surgical history, and problem list.         OB History          3    Para   3    Term   3            AB        Living   3         SAB        IAB        Ectopic        Multiple   0    Live Births   3           Obstetric Comments   :   M;   F;   F               Past Medical History:   Diagnosis Date    Abnormal Pap smear of cervix     GERD (gastroesophageal reflux disease)     High risk HPV infection     Migraine     Raynaud's disease     Varicella     childhood       Past Surgical History:   Procedure Laterality Date    COLPOSCOPY      LAPAROSCOPY Bilateral 2017    Procedure: SILS LAPAROSCOPIC SALPINGECTOMY ;  Surgeon: Monika Cortez MD;  Location:  MAIN OR;  Service:     SALPINGECTOMY Bilateral     TONSILECTOMY AND ADNOIDECTOMY         Family History   Problem Relation Age of Onset    Arthritis Father     Hypertension Father     Ovarian cancer Maternal Grandmother     Uterine cancer Maternal Grandmother     Diabetes Maternal Grandfather     Heart attack Maternal Grandfather     Aneurysm Paternal Grandmother     Arthritis Paternal Grandfather     Heart disease Paternal Grandfather     Colon cancer Paternal Grandfather     No Known Problems Mother     No Known Problems Sister     No Known Problems Daughter     No Known Problems Son     No Known Problems Daughter        Social History     Socioeconomic History    Marital status: /Civil Union     Spouse name: Not on file    Number of children: Not on file    Years of education: Not on file    Highest education level: Not on file   Occupational History    Not on file   Tobacco Use    Smoking status: Never    Smokeless tobacco: Never   Vaping Use    Vaping status: Never Used   Substance and Sexual Activity    Alcohol use: Yes     Comment: socially     Drug use: No    Sexual activity: Yes     Partners: Male     Birth control/protection: Female Sterilization   Other  "Topics Concern    Not on file   Social History Narrative    Not on file     Social Determinants of Health     Financial Resource Strain: Not on file   Food Insecurity: Not on file   Transportation Needs: Not on file   Physical Activity: Not on file   Stress: Not on file   Social Connections: Not on file   Intimate Partner Violence: Not on file   Housing Stability: Not on file         Current Outpatient Medications:     cholecalciferol (VITAMIN D3) 1,000 units tablet, Take 5,000 Units by mouth daily   (Patient not taking: Reported on 4/3/2024), Disp: , Rfl:     Allergies   Allergen Reactions    Cefaclor Rash     Tolerated Pen G in 3/2017  Other reaction(s): rash in childhood      Cat Hair Extract Other (See Comments)       Objective   Vitals:    04/03/24 1215   BP: 116/70   BP Location: Left arm   Patient Position: Sitting   Cuff Size: Standard   Weight: 79.4 kg (175 lb)   Height: 5' 6\" (1.676 m)     Physical Exam  Vitals reviewed.   HENT:      Head: Normocephalic and atraumatic.   Cardiovascular:      Rate and Rhythm: Normal rate and regular rhythm.   Pulmonary:      Effort: Pulmonary effort is normal.      Breath sounds: Normal breath sounds.   Chest:   Breasts:     Breasts are symmetrical.      Right: No swelling, bleeding, inverted nipple, mass, nipple discharge, skin change or tenderness.      Left: No swelling, bleeding, inverted nipple, mass, nipple discharge, skin change or tenderness.   Abdominal:      General: Abdomen is flat. Bowel sounds are normal.      Palpations: Abdomen is soft.      Tenderness: There is no abdominal tenderness. There is no right CVA tenderness, left CVA tenderness or guarding.   Genitourinary:     General: Normal vulva.      Pubic Area: No rash.       Labia:         Right: No rash, tenderness, lesion or injury.         Left: No rash, tenderness, lesion or injury.       Urethra: No prolapse, urethral pain, urethral swelling or urethral lesion.      Vagina: Normal. No signs of injury " and foreign body. No vaginal discharge or erythema.      Cervix: Normal.      Uterus: Normal. Enlarged.       Adnexa: Right adnexa normal and left adnexa normal.      Comments: She does have overall vaginal wall weakness   Ut also midline and mobile, but prominent.   Musculoskeletal:      Cervical back: Neck supple.   Lymphadenopathy:      Upper Body:      Right upper body: No axillary adenopathy.      Left upper body: No axillary adenopathy.   Skin:     General: Skin is warm and dry.   Neurological:      Mental Status: She is alert and oriented to person, place, and time.   Psychiatric:         Mood and Affect: Mood normal.         Behavior: Behavior normal.         Thought Content: Thought content normal.         Judgment: Judgment normal.         There are no Patient Instructions on file for this visit.

## 2024-04-10 ENCOUNTER — HOSPITAL ENCOUNTER (OUTPATIENT)
Dept: RADIOLOGY | Age: 39
Discharge: HOME | End: 2024-04-10
Payer: COMMERCIAL

## 2024-04-10 DIAGNOSIS — N85.2 UTERINE ENLARGEMENT: ICD-10-CM

## 2024-04-10 PROCEDURE — 76856 US EXAM PELVIC COMPLETE: CPT

## 2024-04-10 PROCEDURE — 76830 TRANSVAGINAL US NON-OB: CPT

## 2024-04-11 ENCOUNTER — TELEPHONE (OUTPATIENT)
Age: 39
End: 2024-04-11

## 2024-04-11 DIAGNOSIS — N94.89 ADNEXAL MASS: Primary | ICD-10-CM

## 2024-04-11 NOTE — TELEPHONE ENCOUNTER
Received call from [facility] for significant findings on pelvic US done on 4/10. Call back number [phone].

## 2024-04-20 ENCOUNTER — HOSPITAL ENCOUNTER (OUTPATIENT)
Dept: RADIOLOGY | Facility: HOSPITAL | Age: 39
Discharge: HOME | End: 2024-04-20
Payer: COMMERCIAL

## 2024-04-20 DIAGNOSIS — N94.89 ADNEXAL MASS: ICD-10-CM

## 2024-04-20 PROCEDURE — 72197 MRI PELVIS W/O & W/DYE: CPT

## 2024-04-20 PROCEDURE — A9585 GADOBUTROL INJECTION: HCPCS | Performed by: FAMILY MEDICINE

## 2024-04-20 RX ORDER — GADOBUTROL 604.72 MG/ML
7 INJECTION INTRAVENOUS
Status: COMPLETED | OUTPATIENT
Start: 2024-04-20 | End: 2024-04-20

## 2024-04-20 RX ADMIN — GADOBUTROL 7 ML: 604.72 INJECTION INTRAVENOUS at 08:14

## 2024-04-24 ENCOUNTER — TELEPHONE (OUTPATIENT)
Age: 39
End: 2024-04-24

## 2024-04-24 NOTE — TELEPHONE ENCOUNTER
Patient calling to inquire regarding Pelvis MRI results. RN noted done on 4/20/24, and advised pt that currently radiology results imaging on a 7-10 day timeline across the network, unless STAT order. Staff will call patient with recommendations once office receives results and provider reviews. Advised that likely should have results by time she sees provider for consult. Pt verbalized an understanding. No further questions at this time.

## 2024-04-26 ENCOUNTER — TELEPHONE (OUTPATIENT)
Age: 39
End: 2024-04-26

## 2024-04-26 NOTE — TELEPHONE ENCOUNTER
Patient called stating she received her MyChart results for MRI pelvis and is very anxious for provider to review. Advised once the provider reviews the results we will call her back.

## 2024-04-30 ENCOUNTER — OFFICE VISIT (OUTPATIENT)
Dept: OBGYN CLINIC | Facility: CLINIC | Age: 39
End: 2024-04-30
Payer: COMMERCIAL

## 2024-04-30 ENCOUNTER — TELEPHONE (OUTPATIENT)
Dept: HEMATOLOGY ONCOLOGY | Facility: CLINIC | Age: 39
End: 2024-04-30

## 2024-04-30 VITALS
WEIGHT: 173 LBS | HEIGHT: 66 IN | DIASTOLIC BLOOD PRESSURE: 74 MMHG | SYSTOLIC BLOOD PRESSURE: 118 MMHG | BODY MASS INDEX: 27.8 KG/M2

## 2024-04-30 DIAGNOSIS — N94.89 ADNEXAL MASS: Primary | ICD-10-CM

## 2024-04-30 DIAGNOSIS — N83.201 RIGHT OVARIAN CYST: Primary | ICD-10-CM

## 2024-04-30 PROCEDURE — 99214 OFFICE O/P EST MOD 30 MIN: CPT | Performed by: OBSTETRICS & GYNECOLOGY

## 2024-04-30 NOTE — PROGRESS NOTES
Eileen Hooks is a 39 y.o. G3, P3 female here for a problem visit.  Patient feeling some pressure and had enlargement noticed on pelvic exam ultrasound followed by MRI were performed generally benign appearing but large 12 cm right adnexal cyst with thin septations discussed and reviewed with patient.  Patient was concerns regarding family history of maternal grandmother with ovarian and uterine cancer.  Patient has had prior laparoscopic salpingectomy for sterilization and for risk reduction.  Reviewed with patient procedure for laparoscopic removal of ovary with or without hysterectomy discussed general benign appearance of ovary.  Reviewed with patient option to see gynecologic oncology in case she has any concerns.  After discussion regarding options risks and benefits patient would like referral to gynecologic oncology.  Counseled patient she is aware to contact us if she does not receive a call to schedule appointment.     Gynecologic History  Patient's last menstrual period was 2024 (within days).  Contraception: tubal ligation  Last Pap: 2021. Results were: normal  Last mammogram: First mammogram scheduled    Obstetric History  OB History    Para Term  AB Living   3 3 3     3   SAB IAB Ectopic Multiple Live Births         0 3      # Outcome Date GA Lbr Juanito/2nd Weight Sex Delivery Anes PTL Lv   3 Term 17 40w1d 04:55 / 00:08  F Vag-Spont EPI N JAMAI   2 Term 03/27/15 41w0d  3572 g (7 lb 14 oz) F Vag-Spont EPI  JAMIA   1 Term 04/26/10 41w0d  3884 g (8 lb 9 oz) M Vag-Spont EPI  JAMIA      Obstetric Comments   :   M;   F;   F     Past Medical History:   Diagnosis Date    Abnormal Pap smear of cervix     GERD (gastroesophageal reflux disease)     High risk HPV infection     Migraine     Raynaud's disease     Varicella     childhood     Past Surgical History:   Procedure Laterality Date    COLPOSCOPY      LAPAROSCOPY Bilateral 2017     "Procedure: SILS LAPAROSCOPIC SALPINGECTOMY ;  Surgeon: Monika Cortez MD;  Location: [facility];  Service:     SALPINGECTOMY Bilateral     TONSILECTOMY AND ADNOIDECTOMY       Current Outpatient Medications   Medication Instructions    cholecalciferol 5,000 Units, Oral, Daily     Allergies   Allergen Reactions    Cefaclor Rash     Tolerated Pen G in 3/2017  Other reaction(s): rash in childhood      Cat Hair Extract Other (See Comments)     Social History     Tobacco Use    Smoking status: Never    Smokeless tobacco: Never   Vaping Use    Vaping status: Never Used   Substance Use Topics    Alcohol use: Yes     Alcohol/week: 7.0 standard drinks of alcohol     Types: 7 Glasses of wine per week     Comment: socially     Drug use: No         Review of Systems  Review of Systems   Constitutional: Negative.  Negative for chills, fatigue, fever and unexpected weight change.   HENT: Negative.  Negative for dental problem, sinus pressure and sinus pain.    Eyes: Negative.    Respiratory: Negative.  Negative for cough, shortness of breath and wheezing.    Cardiovascular: Negative.  Negative for chest pain and leg swelling.   Gastrointestinal:  Positive for constipation. Negative for diarrhea, nausea and vomiting.   Genitourinary: Negative.  Negative for menstrual problem, pelvic pain and urgency.   Musculoskeletal: Negative.  Negative for back pain.   Allergic/Immunologic: Positive for environmental allergies.   Neurological: Negative.  Negative for dizziness and headaches.        Objective     /74 (BP Location: Left arm, Patient Position: Sitting, Cuff Size: Standard)   Ht 5' 6\" (1.676 m)   Wt 78.5 kg (173 lb)   LMP 04/04/2024 (Within Days)   BMI 27.92 kg/m²   General appearance: alert and oriented, in no acute distress  Head: Normocephalic, without obvious abnormality, atraumatic  Extremities: extremities normal, warm and well-perfused; no cyanosis, clubbing, or edema      Assessment  39-year-old G3, P3 with large " right ovarian cyst with some thin septations about 12 cm desire surgical removal considering oophorectomy versus hysterectomy with removal of right cyst and adnexa versus bilateral oophorectomy.  Discussed reviewed and counseled.  Reviewed possible risks of surgical menopause.  Patient with family history of ovarian cancer in maternal grandmother.  Her mother also had surgical removal of uterus and ovaries in her 40s and generally did well.  No severe menopausal symptoms.     Plan  Patient would like referral to gynecologic oncology to discuss further different options may consider just removal of ovary and cyst.  Referral placed patient aware to contact us if she does not hear from their office.

## 2024-04-30 NOTE — TELEPHONE ENCOUNTER
I called Jeannine in response to a referral that was received for patient to establish care with Gynecologic Oncology.     Outreach was made to complete patient's intake questionnaire .    Patient's intake questionnaire was reviewed and complete. Patient's intake has been sent to the team for clinical review.

## 2024-05-01 ENCOUNTER — TELEPHONE (OUTPATIENT)
Dept: HEMATOLOGY ONCOLOGY | Facility: CLINIC | Age: 39
End: 2024-05-01

## 2024-05-01 NOTE — TELEPHONE ENCOUNTER
I called Jeannine in response to a referral that was received for patient to establish care with Gynecologic Oncology.     Outreach was made to schedule a consultation.    A consultation was scheduled for patient during this call. Patient is scheduled on 5/17/24 at 1 with Shivani at the [facility]

## 2024-05-17 ENCOUNTER — OFFICE VISIT (OUTPATIENT)
Dept: GYNECOLOGIC ONCOLOGY | Facility: CLINIC | Age: 39
End: 2024-05-17
Payer: COMMERCIAL

## 2024-05-17 VITALS
BODY MASS INDEX: 27.97 KG/M2 | RESPIRATION RATE: 16 BRPM | TEMPERATURE: 98.4 F | SYSTOLIC BLOOD PRESSURE: 132 MMHG | HEIGHT: 66 IN | DIASTOLIC BLOOD PRESSURE: 90 MMHG | OXYGEN SATURATION: 99 % | HEART RATE: 95 BPM | WEIGHT: 174 LBS

## 2024-05-17 DIAGNOSIS — N83.201 RIGHT OVARIAN CYST: Primary | ICD-10-CM

## 2024-05-17 DIAGNOSIS — Z80.41 FAMILY HISTORY OF OVARIAN CANCER: ICD-10-CM

## 2024-05-17 PROCEDURE — 99244 OFF/OP CNSLTJ NEW/EST MOD 40: CPT | Performed by: OBSTETRICS & GYNECOLOGY

## 2024-05-17 RX ORDER — SODIUM CHLORIDE, SODIUM LACTATE, POTASSIUM CHLORIDE, CALCIUM CHLORIDE 600; 310; 30; 20 MG/100ML; MG/100ML; MG/100ML; MG/100ML
125 INJECTION, SOLUTION INTRAVENOUS CONTINUOUS
OUTPATIENT
Start: 2024-05-17

## 2024-05-17 RX ORDER — METRONIDAZOLE 500 MG/100ML
500 INJECTION, SOLUTION INTRAVENOUS ONCE
OUTPATIENT
Start: 2024-05-17 | End: 2024-05-17

## 2024-05-17 RX ORDER — HEPARIN SODIUM 5000 [USP'U]/ML
5000 INJECTION, SOLUTION INTRAVENOUS; SUBCUTANEOUS
OUTPATIENT
Start: 2024-05-18 | End: 2024-05-19

## 2024-05-17 RX ORDER — CEFAZOLIN SODIUM 1 G/50ML
1000 SOLUTION INTRAVENOUS ONCE
OUTPATIENT
Start: 2024-05-17 | End: 2024-05-17

## 2024-05-17 RX ORDER — GABAPENTIN 100 MG/1
200 CAPSULE ORAL ONCE
OUTPATIENT
Start: 2024-05-17 | End: 2024-05-17

## 2024-05-17 RX ORDER — ACETAMINOPHEN 325 MG/1
975 TABLET ORAL ONCE
OUTPATIENT
Start: 2024-05-17 | End: 2024-05-17

## 2024-05-17 NOTE — PROGRESS NOTES
Assessment/Plan:    Problem List Items Addressed This Visit          Endocrine    Right ovarian cyst - Primary     39-year-old  3 para 3 with a 11.4 x 11.9 cm right ovarian cyst, O rad category 3.  She has a family history of endometrial/ovarian cancer.  I reviewed MRI and ultrasound images.  Her performance status is 0.  1.  I reviewed the differential diagnosis of the right ovarian mass including benign, borderline, malignant etiologies.  2.  I discussed the risks of total laparoscopic hysterectomy, right oophorectomy, possible staging including bilateral oophorectomy, lymph node dissection, peritoneal biopsies, omentectomy, possible exploratory laparotomy, all other indicated procedures, examination under anesthesia.  She understands the risks of the operation and agrees to proceed as outlined.  Consent for surgery was obtained by me in the office.  Thank you for the courtesy of this consultation.  All questions were answered by the end of the visit.         Relevant Orders    Case request operating room: HYSTERECTOMY LAPAROSCOPIC TOTAL (LT) (Completed)    Type and screen    CBC and Platelet    Basic metabolic panel    HEMOGLOBIN A1C W/ EAG ESTIMATION    EKG 12 lead    XR chest pa & lateral       Oncology    Family history of ovarian cancer     Referral to oncology genetic counseling for evaluation ideally prior to surgery.         Relevant Orders    Ambulatory Referral to Oncology Genetics           CHIEF COMPLAINT: 12 cm ovarian cyst, O rad category 3, pelvic pressure        Patient ID: Jeannine Hooks is a 39 y.o. female  Who presents as a consultation from Dr. Cortez to discuss treatment options for an O rad category 3 11.4 x 11.9 cm multicystic right ovarian mass.  She was found to have a pelvic mass by physical examination.  She was then sent for a pelvic ultrasound on 4/10/2024 which revealed the uterus to measure 12.4 x 6 cm with endometrial thickness of 1.9 cm.  The right ovary measured 12.8 x 10 cm  and had septations.  It was classified as O rad category 3.  The left ovary was normal.  Subsequent MRI of the pelvis revealed the right ovary to measure 11.4 x 11.9 cm.  Left ovary was normal.  There was no evidence of lymphadenopathy or ascites.  It was read again as O RADS category 3.  Her last Pap in 2021 was normal and HPV negative.  She notes pelvic pressure.  She is moving her bowels.  She is able to perform her activities of daily living without difficulty.        Review of Systems   Constitutional:  Negative for activity change and unexpected weight change.   HENT: Negative.     Eyes: Negative.    Respiratory: Negative.     Cardiovascular: Negative.    Gastrointestinal:  Negative for abdominal distention and abdominal pain.   Endocrine: Negative.    Genitourinary:  Negative for pelvic pain and vaginal bleeding.        Pelvic pressure   Musculoskeletal: Negative.    Skin: Negative.    Allergic/Immunologic: Negative.    Neurological: Negative.    Hematological: Negative.    Psychiatric/Behavioral: Negative.         Current Outpatient Medications   Medication Sig Dispense Refill    cholecalciferol (VITAMIN D3) 1,000 units tablet Take 5,000 Units by mouth daily       No current facility-administered medications for this visit.       Allergies   Allergen Reactions    Cefaclor Rash     Tolerated Pen G in 3/2017  Other reaction(s): rash in childhood      Cat Hair Extract Other (See Comments)       Past Medical History:   Diagnosis Date    Abnormal Pap smear of cervix     GERD (gastroesophageal reflux disease)     High risk HPV infection     Migraine     Raynaud's disease     Varicella     childhood       Past Surgical History:   Procedure Laterality Date    COLPOSCOPY      LAPAROSCOPY Bilateral 2017    Procedure: SILS LAPAROSCOPIC SALPINGECTOMY ;  Surgeon: Monika Cortez MD;  Location: [facility];  Service:     SALPINGECTOMY Bilateral     TONSILECTOMY AND ADNOIDECTOMY         OB History         "  3    Para   3    Term   3            AB        Living   3         SAB        IAB        Ectopic        Multiple   0    Live Births   3           Obstetric Comments   : 2010  M;   F; 2017  F               Family History   Problem Relation Age of Onset    Arthritis Father     Hypertension Father     Ovarian cancer Maternal Grandmother     Uterine cancer Maternal Grandmother     Diabetes Maternal Grandfather     Heart attack Maternal Grandfather     Aneurysm Paternal Grandmother     Arthritis Paternal Grandfather     Heart disease Paternal Grandfather     Colon cancer Paternal Grandfather     No Known Problems Mother     No Known Problems Sister     No Known Problems Daughter     No Known Problems Son     No Known Problems Daughter        The following portions of the patient's history were reviewed and updated as appropriate: allergies, current medications, past family history, past medical history, past social history, past surgical history, and problem list.      Objective:    Blood pressure 132/90, pulse 95, temperature 98.4 °F (36.9 °C), temperature source Temporal, resp. rate 16, height 5' 6\" (1.676 m), weight 78.9 kg (174 lb), last menstrual period 2024, SpO2 99%, not currently breastfeeding.  Body mass index is 28.08 kg/m².    Physical Exam  Vitals reviewed. Exam conducted with a chaperone present.   Constitutional:       General: She is not in acute distress.     Appearance: Normal appearance. She is well-developed and normal weight. She is not ill-appearing, toxic-appearing or diaphoretic.   HENT:      Head: Normocephalic and atraumatic.   Eyes:      General: No scleral icterus.     Extraocular Movements: Extraocular movements intact.      Conjunctiva/sclera: Conjunctivae normal.   Neck:      Thyroid: No thyromegaly.   Cardiovascular:      Rate and Rhythm: Normal rate and regular rhythm.      Heart sounds: Normal heart sounds.   Pulmonary:      Effort: Pulmonary effort is " "normal.      Breath sounds: Normal breath sounds.   Abdominal:      General: There is no distension.      Palpations: Abdomen is soft. There is no mass.      Tenderness: There is no abdominal tenderness. There is no guarding or rebound.      Hernia: No hernia is present.   Genitourinary:     Comments: External female genitalia normal.  No evidence of masses, lesions.  Speculum examination reveals a grossly normal vagina.  The cervix was posterior and was grossly normal.  No evidence of masses, lesions, bleeding.  Bimanual examination revealed a large mass filling the pelvis.  The mass is cystic.  The uterus is displaced anteriorly due to the mass.  No evidence of parametrial disease.  The uterus moves independently of the mass.  Musculoskeletal:         General: No swelling or tenderness.      Cervical back: Normal range of motion and neck supple.      Right lower leg: No edema.      Left lower leg: No edema.   Lymphadenopathy:      Cervical: No cervical adenopathy.   Skin:     General: Skin is warm and dry.      Coloration: Skin is not jaundiced or pale.      Findings: No lesion or rash.   Neurological:      General: No focal deficit present.      Mental Status: She is alert and oriented to person, place, and time. Mental status is at baseline.      Cranial Nerves: No cranial nerve deficit.      Motor: No weakness.      Gait: Gait normal.   Psychiatric:         Mood and Affect: Mood normal.         Behavior: Behavior normal.         Thought Content: Thought content normal.         Judgment: Judgment normal.           No results found for: \"\"  Lab Results   Component Value Date    WBC 4.91 12/12/2019    HGB 13.8 12/12/2019    HCT 42.2 12/12/2019    MCV 89 12/12/2019     12/12/2019     Lab Results   Component Value Date    K 4.9 12/12/2019     (H) 12/12/2019    CO2 30 12/12/2019    BUN 13 12/12/2019    CREATININE 0.69 12/12/2019    GLUF 74 12/12/2019    CALCIUM 9.0 12/12/2019    AST 12 12/12/2019    " ALT 19 12/12/2019    ALKPHOS 51 12/12/2019    EGFR 112 12/12/2019     MRI pelvis female wo and w contrast  Narrative: MRI OF THE PELVIS WITH AND WITHOUT CONTRAST (GYNECOLOGIC)    INDICATION: 39 years / Female. N94.89: Other specified conditions associated with female genital organs and menstrual cycle.    COMPARISON: Pelvic ultrasound 4/10/2024.    TECHNIQUE: Multiplanar/multisequence MRI of the female pelvis was performed before and after administration of contrast.    IV Contrast: 7 mL of Gadobutrol injection (SINGLE-DOSE)    FINDINGS:    UTERUS:  Junctional zone: Normal thickness.  Myometrium: Normal.  Endometrium: Normal thickness without mass.    ADNEXA:  Right:  Ovary normal in size and morphology.    Focal adnexal lesions as follows:  Lesion: 1  - Size: 11.4 x 8.3 x 11.9 cm. #6/15 and #4/17  - Morphology: Unilocular cyst without solid component  - Cystic component: Present, as detailed below:  *  Fluid descriptors: Simple  *  Signal descriptors: Homogeneous  *  Additional features: Smooth wall  - Solid tissue: None.  - O-RADS MRI category: O-RADS MRI 3: Low risk (~5% PPV for malignancy).    Patient is status post bilateral salpingectomy as per the supplied history.    Left:  Ovary normal in size and morphology.  No suspicious adnexal lesion.  No hydrosalpinx.    URINARY BLADDER: Normal.    PELVIC CAVITY: No lymphadenopathy. No ascites.    BOWEL: No dilated loops of bowel.    VESSELS: No aneurysm.    PELVIC WALL: Unremarkable    BONES: No suspicious osseous lesion.  Impression: Approximately 12 cm multilocular right ovarian cyst with a few thin septations and no nodular components. O-RADS MRI 3: Low risk (~5% PPV for malignancy).    Workstation performed: [id number]

## 2024-05-17 NOTE — ASSESSMENT & PLAN NOTE
39-year-old  3 para 3 with a 11.4 x 11.9 cm right ovarian cyst, O rad category 3.  She has a family history of endometrial/ovarian cancer.  I reviewed MRI and ultrasound images.  Her performance status is 0.  1.  I reviewed the differential diagnosis of the right ovarian mass including benign, borderline, malignant etiologies.  2.  I discussed the risks of total laparoscopic hysterectomy, right oophorectomy, possible staging including bilateral oophorectomy, lymph node dissection, peritoneal biopsies, omentectomy, possible exploratory laparotomy, all other indicated procedures, examination under anesthesia.  She understands the risks of the operation and agrees to proceed as outlined.  Consent for surgery was obtained by me in the office.  Thank you for the courtesy of this consultation.  All questions were answered by the end of the visit.

## 2024-05-17 NOTE — H&P (VIEW-ONLY)
Assessment/Plan:    Problem List Items Addressed This Visit          Endocrine    Right ovarian cyst - Primary     39-year-old  3 para 3 with a 11.4 x 11.9 cm right ovarian cyst, O rad category 3.  She has a family history of endometrial/ovarian cancer.  I reviewed MRI and ultrasound images.  Her performance status is 0.  1.  I reviewed the differential diagnosis of the right ovarian mass including benign, borderline, malignant etiologies.  2.  I discussed the risks of total laparoscopic hysterectomy, right oophorectomy, possible staging including bilateral oophorectomy, lymph node dissection, peritoneal biopsies, omentectomy, possible exploratory laparotomy, all other indicated procedures, examination under anesthesia.  She understands the risks of the operation and agrees to proceed as outlined.  Consent for surgery was obtained by me in the office.  Thank you for the courtesy of this consultation.  All questions were answered by the end of the visit.         Relevant Orders    Case request operating room: HYSTERECTOMY LAPAROSCOPIC TOTAL (LT) (Completed)    Type and screen    CBC and Platelet    Basic metabolic panel    HEMOGLOBIN A1C W/ EAG ESTIMATION    EKG 12 lead    XR chest pa & lateral       Oncology    Family history of ovarian cancer     Referral to oncology genetic counseling for evaluation ideally prior to surgery.         Relevant Orders    Ambulatory Referral to Oncology Genetics           CHIEF COMPLAINT: 12 cm ovarian cyst, O rad category 3, pelvic pressure        Patient ID: Jeannine Hooks is a 39 y.o. female  Who presents as a consultation from Dr. Cortez to discuss treatment options for an O rad category 3 11.4 x 11.9 cm multicystic right ovarian mass.  She was found to have a pelvic mass by physical examination.  She was then sent for a pelvic ultrasound on 4/10/2024 which revealed the uterus to measure 12.4 x 6 cm with endometrial thickness of 1.9 cm.  The right ovary measured 12.8 x 10 cm  and had septations.  It was classified as O rad category 3.  The left ovary was normal.  Subsequent MRI of the pelvis revealed the right ovary to measure 11.4 x 11.9 cm.  Left ovary was normal.  There was no evidence of lymphadenopathy or ascites.  It was read again as O RADS category 3.  Her last Pap in 2021 was normal and HPV negative.  She notes pelvic pressure.  She is moving her bowels.  She is able to perform her activities of daily living without difficulty.        Review of Systems   Constitutional:  Negative for activity change and unexpected weight change.   HENT: Negative.     Eyes: Negative.    Respiratory: Negative.     Cardiovascular: Negative.    Gastrointestinal:  Negative for abdominal distention and abdominal pain.   Endocrine: Negative.    Genitourinary:  Negative for pelvic pain and vaginal bleeding.        Pelvic pressure   Musculoskeletal: Negative.    Skin: Negative.    Allergic/Immunologic: Negative.    Neurological: Negative.    Hematological: Negative.    Psychiatric/Behavioral: Negative.         Current Outpatient Medications   Medication Sig Dispense Refill    cholecalciferol (VITAMIN D3) 1,000 units tablet Take 5,000 Units by mouth daily       No current facility-administered medications for this visit.       Allergies   Allergen Reactions    Cefaclor Rash     Tolerated Pen G in 3/2017  Other reaction(s): rash in childhood      Cat Hair Extract Other (See Comments)       Past Medical History:   Diagnosis Date    Abnormal Pap smear of cervix     GERD (gastroesophageal reflux disease)     High risk HPV infection     Migraine     Raynaud's disease     Varicella     childhood       Past Surgical History:   Procedure Laterality Date    COLPOSCOPY      LAPAROSCOPY Bilateral 2017    Procedure: SILS LAPAROSCOPIC SALPINGECTOMY ;  Surgeon: Monika Cortez MD;  Location: [facility];  Service:     SALPINGECTOMY Bilateral     TONSILECTOMY AND ADNOIDECTOMY         OB History         "  3    Para   3    Term   3            AB        Living   3         SAB        IAB        Ectopic        Multiple   0    Live Births   3           Obstetric Comments   : 2010  M;   F; 2017  F               Family History   Problem Relation Age of Onset    Arthritis Father     Hypertension Father     Ovarian cancer Maternal Grandmother     Uterine cancer Maternal Grandmother     Diabetes Maternal Grandfather     Heart attack Maternal Grandfather     Aneurysm Paternal Grandmother     Arthritis Paternal Grandfather     Heart disease Paternal Grandfather     Colon cancer Paternal Grandfather     No Known Problems Mother     No Known Problems Sister     No Known Problems Daughter     No Known Problems Son     No Known Problems Daughter        The following portions of the patient's history were reviewed and updated as appropriate: allergies, current medications, past family history, past medical history, past social history, past surgical history, and problem list.      Objective:    Blood pressure 132/90, pulse 95, temperature 98.4 °F (36.9 °C), temperature source Temporal, resp. rate 16, height 5' 6\" (1.676 m), weight 78.9 kg (174 lb), last menstrual period 2024, SpO2 99%, not currently breastfeeding.  Body mass index is 28.08 kg/m².    Physical Exam  Vitals reviewed. Exam conducted with a chaperone present.   Constitutional:       General: She is not in acute distress.     Appearance: Normal appearance. She is well-developed and normal weight. She is not ill-appearing, toxic-appearing or diaphoretic.   HENT:      Head: Normocephalic and atraumatic.   Eyes:      General: No scleral icterus.     Extraocular Movements: Extraocular movements intact.      Conjunctiva/sclera: Conjunctivae normal.   Neck:      Thyroid: No thyromegaly.   Cardiovascular:      Rate and Rhythm: Normal rate and regular rhythm.      Heart sounds: Normal heart sounds.   Pulmonary:      Effort: Pulmonary effort is " "normal.      Breath sounds: Normal breath sounds.   Abdominal:      General: There is no distension.      Palpations: Abdomen is soft. There is no mass.      Tenderness: There is no abdominal tenderness. There is no guarding or rebound.      Hernia: No hernia is present.   Genitourinary:     Comments: External female genitalia normal.  No evidence of masses, lesions.  Speculum examination reveals a grossly normal vagina.  The cervix was posterior and was grossly normal.  No evidence of masses, lesions, bleeding.  Bimanual examination revealed a large mass filling the pelvis.  The mass is cystic.  The uterus is displaced anteriorly due to the mass.  No evidence of parametrial disease.  The uterus moves independently of the mass.  Musculoskeletal:         General: No swelling or tenderness.      Cervical back: Normal range of motion and neck supple.      Right lower leg: No edema.      Left lower leg: No edema.   Lymphadenopathy:      Cervical: No cervical adenopathy.   Skin:     General: Skin is warm and dry.      Coloration: Skin is not jaundiced or pale.      Findings: No lesion or rash.   Neurological:      General: No focal deficit present.      Mental Status: She is alert and oriented to person, place, and time. Mental status is at baseline.      Cranial Nerves: No cranial nerve deficit.      Motor: No weakness.      Gait: Gait normal.   Psychiatric:         Mood and Affect: Mood normal.         Behavior: Behavior normal.         Thought Content: Thought content normal.         Judgment: Judgment normal.           No results found for: \"\"  Lab Results   Component Value Date    WBC 4.91 12/12/2019    HGB 13.8 12/12/2019    HCT 42.2 12/12/2019    MCV 89 12/12/2019     12/12/2019     Lab Results   Component Value Date    K 4.9 12/12/2019     (H) 12/12/2019    CO2 30 12/12/2019    BUN 13 12/12/2019    CREATININE 0.69 12/12/2019    GLUF 74 12/12/2019    CALCIUM 9.0 12/12/2019    AST 12 12/12/2019    " ALT 19 12/12/2019    ALKPHOS 51 12/12/2019    EGFR 112 12/12/2019     MRI pelvis female wo and w contrast  Narrative: MRI OF THE PELVIS WITH AND WITHOUT CONTRAST (GYNECOLOGIC)    INDICATION: 39 years / Female. N94.89: Other specified conditions associated with female genital organs and menstrual cycle.    COMPARISON: Pelvic ultrasound 4/10/2024.    TECHNIQUE: Multiplanar/multisequence MRI of the female pelvis was performed before and after administration of contrast.    IV Contrast: 7 mL of Gadobutrol injection (SINGLE-DOSE)    FINDINGS:    UTERUS:  Junctional zone: Normal thickness.  Myometrium: Normal.  Endometrium: Normal thickness without mass.    ADNEXA:  Right:  Ovary normal in size and morphology.    Focal adnexal lesions as follows:  Lesion: 1  - Size: 11.4 x 8.3 x 11.9 cm. #6/15 and #4/17  - Morphology: Unilocular cyst without solid component  - Cystic component: Present, as detailed below:  *  Fluid descriptors: Simple  *  Signal descriptors: Homogeneous  *  Additional features: Smooth wall  - Solid tissue: None.  - O-RADS MRI category: O-RADS MRI 3: Low risk (~5% PPV for malignancy).    Patient is status post bilateral salpingectomy as per the supplied history.    Left:  Ovary normal in size and morphology.  No suspicious adnexal lesion.  No hydrosalpinx.    URINARY BLADDER: Normal.    PELVIC CAVITY: No lymphadenopathy. No ascites.    BOWEL: No dilated loops of bowel.    VESSELS: No aneurysm.    PELVIC WALL: Unremarkable    BONES: No suspicious osseous lesion.  Impression: Approximately 12 cm multilocular right ovarian cyst with a few thin septations and no nodular components. O-RADS MRI 3: Low risk (~5% PPV for malignancy).    Workstation performed: [id number]

## 2024-05-21 ENCOUNTER — TELEPHONE (OUTPATIENT)
Dept: HEMATOLOGY ONCOLOGY | Facility: CLINIC | Age: 39
End: 2024-05-21

## 2024-05-21 NOTE — TELEPHONE ENCOUNTER
I called Jeannine in response to a referral that was received for patient to establish care with [facility].     Outreach was made to schedule a consultation.    I left a voicemail explaining the reason for my call and advised patient to call [facility] at [phone].  The referral has been closed.

## 2024-05-23 ENCOUNTER — TELEPHONE (OUTPATIENT)
Dept: HEMATOLOGY ONCOLOGY | Facility: CLINIC | Age: 39
End: 2024-05-23

## 2024-05-23 NOTE — TELEPHONE ENCOUNTER
Pt is asking about getting scheduled before her surgery on 6/12/24. Pt is having surgery with Dr Parrish for removal of right ovary due to a large ovarian cyst. Pt may also have an elective hysterectomy but she was under the impression that Dr Parrish wanted her to have this before the surgery. Please review pts chart and call pt to get her scheduled before her 6/12/24 surgery.

## 2024-05-23 NOTE — TELEPHONE ENCOUNTER
I spoke with Jeannine to schedule their consultation with [facility].     Scheduling Outcome: Awaiting approval from genetics team    Personal/Family History Related to Appointment:     Personal History of Cancer: Patient reports no personal history of cancer    Family History of Cancer: Patient reports family history of maternal grandmother ovarian cancer diagnosed age 50's uterine cancer diagnosed age 50's,  paternal grandfather colon cancer diagnosed age 86    Is patient of Ashkenazi Yazdanism Heritage?: No    History of Genetic Testing:  Personal History of Genetic Testing: Patient report no personal history of Genetic Testing.    Family History of Genetic Testing: Patient reports that member(s) of their family have had Genetic Testing. Details: sister brca negative    Patient's preferred e-mail address: [email or web address]

## 2024-05-28 ENCOUNTER — TELEPHONE (OUTPATIENT)
Dept: HEMATOLOGY ONCOLOGY | Facility: CLINIC | Age: 39
End: 2024-05-28

## 2024-05-28 NOTE — TELEPHONE ENCOUNTER
I called Jeannine to schedule a new patient appointment with the [facility]etics Program.      Outcome:   I left a voice message encouraging the patient to call the [facility] at [phone] to schedule this appointment. A [employer] message was also send with our contact information.     Follow-up:   We will make one more attempt to reach the patient.        Patients referral is now a ASAP referral and needs to be seen ASAP. If the patient calls back today please offer her a virtual with Joy for today.

## 2024-05-30 ENCOUNTER — LAB REQUISITION (OUTPATIENT)
Dept: LAB | Facility: HOSPITAL | Age: 39
End: 2024-05-30
Payer: COMMERCIAL

## 2024-05-30 ENCOUNTER — APPOINTMENT (OUTPATIENT)
Dept: RADIOLOGY | Age: 39
End: 2024-05-30
Payer: COMMERCIAL

## 2024-05-30 ENCOUNTER — APPOINTMENT (OUTPATIENT)
Dept: LAB | Age: 39
End: 2024-05-30
Payer: COMMERCIAL

## 2024-05-30 DIAGNOSIS — N83.201 RIGHT OVARIAN CYST: ICD-10-CM

## 2024-05-30 DIAGNOSIS — N83.201 UNSPECIFIED OVARIAN CYST, RIGHT SIDE: ICD-10-CM

## 2024-05-30 LAB
ABO GROUP BLD: NORMAL
ANION GAP SERPL CALCULATED.3IONS-SCNC: 5 MMOL/L (ref 4–13)
BLD GP AB SCN SERPL QL: NEGATIVE
BUN SERPL-MCNC: 12 MG/DL (ref 5–25)
CALCIUM SERPL-MCNC: 9.1 MG/DL (ref 8.4–10.2)
CHLORIDE SERPL-SCNC: 106 MMOL/L (ref 96–108)
CO2 SERPL-SCNC: 29 MMOL/L (ref 21–32)
CREAT SERPL-MCNC: 0.6 MG/DL (ref 0.6–1.3)
ERYTHROCYTE [DISTWIDTH] IN BLOOD BY AUTOMATED COUNT: 13.2 % (ref 11.6–15.1)
EST. AVERAGE GLUCOSE BLD GHB EST-MCNC: 100 MG/DL
GFR SERPL CREATININE-BSD FRML MDRD: 113 ML/MIN/1.73SQ M
GLUCOSE P FAST SERPL-MCNC: 82 MG/DL (ref 65–99)
HBA1C MFR BLD: 5.1 %
HCT VFR BLD AUTO: 43.6 % (ref 34.8–46.1)
HGB BLD-MCNC: 14.3 G/DL (ref 11.5–15.4)
MCH RBC QN AUTO: 29.1 PG (ref 26.8–34.3)
MCHC RBC AUTO-ENTMCNC: 32.8 G/DL (ref 31.4–37.4)
MCV RBC AUTO: 89 FL (ref 82–98)
PLATELET # BLD AUTO: 294 THOUSANDS/UL (ref 149–390)
PMV BLD AUTO: 11.3 FL (ref 8.9–12.7)
POTASSIUM SERPL-SCNC: 4.5 MMOL/L (ref 3.5–5.3)
RBC # BLD AUTO: 4.92 MILLION/UL (ref 3.81–5.12)
RH BLD: POSITIVE
SODIUM SERPL-SCNC: 140 MMOL/L (ref 135–147)
SPECIMEN EXPIRATION DATE: NORMAL
WBC # BLD AUTO: 5.23 THOUSAND/UL (ref 4.31–10.16)

## 2024-05-30 PROCEDURE — 86901 BLOOD TYPING SEROLOGIC RH(D): CPT | Performed by: OBSTETRICS & GYNECOLOGY

## 2024-05-30 PROCEDURE — 80048 BASIC METABOLIC PNL TOTAL CA: CPT

## 2024-05-30 PROCEDURE — 85027 COMPLETE CBC AUTOMATED: CPT

## 2024-05-30 PROCEDURE — 86900 BLOOD TYPING SEROLOGIC ABO: CPT | Performed by: OBSTETRICS & GYNECOLOGY

## 2024-05-30 PROCEDURE — 93005 ELECTROCARDIOGRAM TRACING: CPT

## 2024-05-30 PROCEDURE — 86850 RBC ANTIBODY SCREEN: CPT | Performed by: OBSTETRICS & GYNECOLOGY

## 2024-05-30 PROCEDURE — 83036 HEMOGLOBIN GLYCOSYLATED A1C: CPT

## 2024-05-30 PROCEDURE — 36415 COLL VENOUS BLD VENIPUNCTURE: CPT

## 2024-05-30 PROCEDURE — 71046 X-RAY EXAM CHEST 2 VIEWS: CPT

## 2024-05-31 LAB
ATRIAL RATE: 77 BPM
P AXIS: 58 DEGREES
PR INTERVAL: 146 MS
QRS AXIS: -3 DEGREES
QRSD INTERVAL: 64 MS
QT INTERVAL: 370 MS
QTC INTERVAL: 418 MS
T WAVE AXIS: 0 DEGREES
VENTRICULAR RATE: 77 BPM

## 2024-05-31 PROCEDURE — 93010 ELECTROCARDIOGRAM REPORT: CPT | Performed by: INTERNAL MEDICINE

## 2024-06-04 ENCOUNTER — ANESTHESIA EVENT (OUTPATIENT)
Dept: PERIOP | Facility: HOSPITAL | Age: 39
End: 2024-06-04
Payer: COMMERCIAL

## 2024-06-05 ENCOUNTER — TELEPHONE (OUTPATIENT)
Dept: HEMATOLOGY ONCOLOGY | Facility: CLINIC | Age: 39
End: 2024-06-05

## 2024-06-05 RX ORDER — FAMOTIDINE 10 MG
10 TABLET ORAL 2 TIMES DAILY
COMMUNITY

## 2024-06-05 RX ORDER — CALCIUM CARBONATE 500 MG/1
1 TABLET, CHEWABLE ORAL DAILY
COMMUNITY

## 2024-06-05 NOTE — TELEPHONE ENCOUNTER
Patient Call    Who are you speaking with? Patient    If it is not the patient, are they listed on an active communication consent form? Yes   What is the reason for this call? Questions about [place] paperwork for    Does this require a call back? Yes   If a call back is required, please list best call back number [phone]    If a call back is required, advise that a message will be forwarded to their care team and someone will return their call as soon as possible.   Did you relay this information to the patient? Yes

## 2024-06-05 NOTE — PRE-PROCEDURE INSTRUCTIONS
Pre-Surgery Instructions:   Medication Instructions    calcium carbonate (TUMS) 500 mg chewable tablet Stop taking 7 days prior to surgery.    cholecalciferol (VITAMIN D3) 1,000 units tablet Hold day of surgery.    famotidine (PEPCID) 10 mg tablet Uses PRN- OK to take day of surgery    Medication instructions for day surgery reviewed. Please use only a sip of water to take your instructed medications. Avoid all over the counter vitamins, supplements and NSAIDS for one week prior to surgery per anesthesia guidelines. Tylenol is ok to take as needed.     You will receive a call one business day prior to surgery with an arrival time and hospital directions. If your surgery is scheduled on a Monday, the hospital will be calling you on the Friday prior to your surgery. If you have not heard from anyone by 8pm, please call the hospital supervisor through the hospital  at [phone]. ([place] [phone] or [place] [phone]).    Do not eat or drink anything after midnight the night before your surgery, including candy, mints, lifesavers, or chewing gum. Do not drink alcohol 24hrs before your surgery. Try not to smoke at least 24hrs before your surgery.       Follow the pre surgery showering instructions as listed in the “My Surgical Experience Booklet” or otherwise provided by your surgeon's office. Do not use a blade to shave the surgical area 1 week before surgery. It is okay to use a clean electric clippers up to 24 hours before surgery. Do not apply any lotions, creams, including makeup, cologne, deodorant, or perfumes after showering on the day of your surgery. Do not use dry shampoo, hair spray, hair gel, or any type of hair products.     No contact lenses, eye make-up, or artificial eyelashes. Remove nail polish, including gel polish, and any artificial, gel, or acrylic nails if possible. Remove all jewelry including rings and body piercing jewelry.     Wear causal clothing that is easy to take on  and off. Consider your type of surgery.    Keep any valuables, jewelry, piercings at home. Please bring any specially ordered equipment (sling, braces) if indicated.    Arrange for a responsible person to drive you to and from the hospital on the day of your surgery. Please confirm the visitor policy for the day of your procedure when you receive your phone call with an arrival time.     Call the surgeon's office with any new illnesses, exposures, or additional questions prior to surgery.    Please reference your “My Surgical Experience Booklet” for additional information to prepare for your upcoming surgery.     Confirmed she received 3 pre-surgical drinks with instructions.

## 2024-06-12 ENCOUNTER — ANESTHESIA (OUTPATIENT)
Dept: PERIOP | Facility: HOSPITAL | Age: 39
End: 2024-06-12
Payer: COMMERCIAL

## 2024-06-12 ENCOUNTER — HOSPITAL ENCOUNTER (OUTPATIENT)
Facility: HOSPITAL | Age: 39
Setting detail: OUTPATIENT SURGERY
Discharge: HOME | End: 2024-06-12
Attending: OBSTETRICS & GYNECOLOGY | Admitting: OBSTETRICS & GYNECOLOGY
Payer: COMMERCIAL

## 2024-06-12 VITALS
TEMPERATURE: 97.2 F | DIASTOLIC BLOOD PRESSURE: 54 MMHG | WEIGHT: 169.53 LBS | OXYGEN SATURATION: 95 % | SYSTOLIC BLOOD PRESSURE: 111 MMHG | HEART RATE: 80 BPM | HEIGHT: 66 IN | BODY MASS INDEX: 27.25 KG/M2 | RESPIRATION RATE: 16 BRPM

## 2024-06-12 DIAGNOSIS — N83.201 RIGHT OVARIAN CYST: ICD-10-CM

## 2024-06-12 DIAGNOSIS — Z90.710 STATUS POST HYSTERECTOMY: Primary | ICD-10-CM

## 2024-06-12 LAB
ABO GROUP BLD: NORMAL
BLD GP AB SCN SERPL QL: NEGATIVE
EXT PREGNANCY TEST URINE: NEGATIVE
EXT. CONTROL: NORMAL
RH BLD: POSITIVE
SPECIMEN EXPIRATION DATE: NORMAL

## 2024-06-12 PROCEDURE — 81025 URINE PREGNANCY TEST: CPT | Performed by: OBSTETRICS & GYNECOLOGY

## 2024-06-12 PROCEDURE — 88331 PATH CONSLTJ SURG 1 BLK 1SPC: CPT | Performed by: STUDENT IN AN ORGANIZED HEALTH CARE EDUCATION/TRAINING PROGRAM

## 2024-06-12 PROCEDURE — 86900 BLOOD TYPING SEROLOGIC ABO: CPT | Performed by: OBSTETRICS & GYNECOLOGY

## 2024-06-12 PROCEDURE — 88332 PATH CONSLTJ SURG EA ADD BLK: CPT | Performed by: STUDENT IN AN ORGANIZED HEALTH CARE EDUCATION/TRAINING PROGRAM

## 2024-06-12 PROCEDURE — 86850 RBC ANTIBODY SCREEN: CPT | Performed by: OBSTETRICS & GYNECOLOGY

## 2024-06-12 PROCEDURE — 86901 BLOOD TYPING SEROLOGIC RH(D): CPT | Performed by: OBSTETRICS & GYNECOLOGY

## 2024-06-12 PROCEDURE — 58571 TLH W/T/O 250 G OR LESS: CPT | Performed by: OBSTETRICS & GYNECOLOGY

## 2024-06-12 PROCEDURE — 88307 TISSUE EXAM BY PATHOLOGIST: CPT | Performed by: STUDENT IN AN ORGANIZED HEALTH CARE EDUCATION/TRAINING PROGRAM

## 2024-06-12 PROCEDURE — 88344 IMHCHEM/IMCYTCHM EA MLT ANTB: CPT | Performed by: STUDENT IN AN ORGANIZED HEALTH CARE EDUCATION/TRAINING PROGRAM

## 2024-06-12 RX ORDER — LIDOCAINE HYDROCHLORIDE 10 MG/ML
INJECTION, SOLUTION EPIDURAL; INFILTRATION; INTRACAUDAL; PERINEURAL AS NEEDED
Status: DISCONTINUED | OUTPATIENT
Start: 2024-06-12 | End: 2024-06-12

## 2024-06-12 RX ORDER — GABAPENTIN 100 MG/1
200 CAPSULE ORAL ONCE
Status: COMPLETED | OUTPATIENT
Start: 2024-06-12 | End: 2024-06-12

## 2024-06-12 RX ORDER — FENTANYL CITRATE/PF 50 MCG/ML
50 SYRINGE (ML) INJECTION
Status: DISCONTINUED | OUTPATIENT
Start: 2024-06-12 | End: 2024-06-12 | Stop reason: HOSPADM

## 2024-06-12 RX ORDER — LORAZEPAM 2 MG/ML
0.5 INJECTION INTRAMUSCULAR ONCE
Status: COMPLETED | OUTPATIENT
Start: 2024-06-12 | End: 2024-06-12

## 2024-06-12 RX ORDER — MIDAZOLAM HYDROCHLORIDE 2 MG/2ML
INJECTION, SOLUTION INTRAMUSCULAR; INTRAVENOUS AS NEEDED
Status: DISCONTINUED | OUTPATIENT
Start: 2024-06-12 | End: 2024-06-12

## 2024-06-12 RX ORDER — ACETAMINOPHEN 325 MG/1
975 TABLET ORAL EVERY 6 HOURS PRN
Status: DISCONTINUED | OUTPATIENT
Start: 2024-06-12 | End: 2024-06-12 | Stop reason: HOSPADM

## 2024-06-12 RX ORDER — SODIUM CHLORIDE 9 MG/ML
100 INJECTION, SOLUTION INTRAVENOUS CONTINUOUS
Status: DISCONTINUED | OUTPATIENT
Start: 2024-06-12 | End: 2024-06-12 | Stop reason: HOSPADM

## 2024-06-12 RX ORDER — CEFAZOLIN SODIUM 1 G/50ML
1000 SOLUTION INTRAVENOUS ONCE
Status: DISCONTINUED | OUTPATIENT
Start: 2024-06-12 | End: 2024-06-12

## 2024-06-12 RX ORDER — PROPOFOL 10 MG/ML
INJECTION, EMULSION INTRAVENOUS AS NEEDED
Status: DISCONTINUED | OUTPATIENT
Start: 2024-06-12 | End: 2024-06-12

## 2024-06-12 RX ORDER — PHENYLEPHRINE HCL IN 0.9% NACL 1 MG/10 ML
SYRINGE (ML) INTRAVENOUS AS NEEDED
Status: DISCONTINUED | OUTPATIENT
Start: 2024-06-12 | End: 2024-06-12

## 2024-06-12 RX ORDER — CLINDAMYCIN PHOSPHATE 900 MG/50ML
900 INJECTION, SOLUTION INTRAVENOUS ONCE
Status: COMPLETED | OUTPATIENT
Start: 2024-06-12 | End: 2024-06-12

## 2024-06-12 RX ORDER — SODIUM CHLORIDE 9 MG/ML
125 INJECTION, SOLUTION INTRAVENOUS CONTINUOUS
Status: DISCONTINUED | OUTPATIENT
Start: 2024-06-12 | End: 2024-06-12

## 2024-06-12 RX ORDER — SODIUM CHLORIDE, SODIUM LACTATE, POTASSIUM CHLORIDE, CALCIUM CHLORIDE 600; 310; 30; 20 MG/100ML; MG/100ML; MG/100ML; MG/100ML
INJECTION, SOLUTION INTRAVENOUS CONTINUOUS PRN
Status: DISCONTINUED | OUTPATIENT
Start: 2024-06-12 | End: 2024-06-12

## 2024-06-12 RX ORDER — OXYCODONE HYDROCHLORIDE 5 MG/1
5 TABLET ORAL EVERY 4 HOURS PRN
Qty: 10 TABLET | Refills: 0 | Status: SHIPPED | OUTPATIENT
Start: 2024-06-12 | End: 2024-06-22

## 2024-06-12 RX ORDER — OXYCODONE HYDROCHLORIDE 5 MG/1
5 TABLET ORAL EVERY 4 HOURS PRN
Status: DISCONTINUED | OUTPATIENT
Start: 2024-06-12 | End: 2024-06-12 | Stop reason: HOSPADM

## 2024-06-12 RX ORDER — MAGNESIUM HYDROXIDE 1200 MG/15ML
LIQUID ORAL AS NEEDED
Status: DISCONTINUED | OUTPATIENT
Start: 2024-06-12 | End: 2024-06-12 | Stop reason: HOSPADM

## 2024-06-12 RX ORDER — HEPARIN SODIUM 5000 [USP'U]/ML
5000 INJECTION, SOLUTION INTRAVENOUS; SUBCUTANEOUS
Status: COMPLETED | OUTPATIENT
Start: 2024-06-12 | End: 2024-06-12

## 2024-06-12 RX ORDER — PROMETHAZINE HYDROCHLORIDE 25 MG/ML
6.25 INJECTION, SOLUTION INTRAMUSCULAR; INTRAVENOUS ONCE AS NEEDED
Status: DISCONTINUED | OUTPATIENT
Start: 2024-06-12 | End: 2024-06-12 | Stop reason: HOSPADM

## 2024-06-12 RX ORDER — FENTANYL CITRATE 50 UG/ML
INJECTION, SOLUTION INTRAMUSCULAR; INTRAVENOUS AS NEEDED
Status: DISCONTINUED | OUTPATIENT
Start: 2024-06-12 | End: 2024-06-12

## 2024-06-12 RX ORDER — ONDANSETRON 2 MG/ML
4 INJECTION INTRAMUSCULAR; INTRAVENOUS ONCE AS NEEDED
Status: DISCONTINUED | OUTPATIENT
Start: 2024-06-12 | End: 2024-06-12 | Stop reason: HOSPADM

## 2024-06-12 RX ORDER — MEPERIDINE HYDROCHLORIDE 25 MG/ML
12.5 INJECTION INTRAMUSCULAR; INTRAVENOUS; SUBCUTANEOUS ONCE AS NEEDED
Status: DISCONTINUED | OUTPATIENT
Start: 2024-06-12 | End: 2024-06-12 | Stop reason: HOSPADM

## 2024-06-12 RX ORDER — ALBUTEROL SULFATE 90 UG/1
AEROSOL, METERED RESPIRATORY (INHALATION) AS NEEDED
Status: DISCONTINUED | OUTPATIENT
Start: 2024-06-12 | End: 2024-06-12

## 2024-06-12 RX ORDER — DEXAMETHASONE SODIUM PHOSPHATE 10 MG/ML
INJECTION, SOLUTION INTRAMUSCULAR; INTRAVENOUS AS NEEDED
Status: DISCONTINUED | OUTPATIENT
Start: 2024-06-12 | End: 2024-06-12

## 2024-06-12 RX ORDER — SODIUM CHLORIDE, SODIUM LACTATE, POTASSIUM CHLORIDE, CALCIUM CHLORIDE 600; 310; 30; 20 MG/100ML; MG/100ML; MG/100ML; MG/100ML
125 INJECTION, SOLUTION INTRAVENOUS CONTINUOUS
Status: DISCONTINUED | OUTPATIENT
Start: 2024-06-12 | End: 2024-06-12

## 2024-06-12 RX ORDER — ACETAMINOPHEN 325 MG/1
650 TABLET ORAL EVERY 6 HOURS PRN
Qty: 60 TABLET | Refills: 0 | Status: SHIPPED | OUTPATIENT
Start: 2024-06-12 | End: 2024-07-12

## 2024-06-12 RX ORDER — EPHEDRINE SULFATE 50 MG/ML
INJECTION INTRAVENOUS AS NEEDED
Status: DISCONTINUED | OUTPATIENT
Start: 2024-06-12 | End: 2024-06-12

## 2024-06-12 RX ORDER — ROCURONIUM BROMIDE 10 MG/ML
INJECTION, SOLUTION INTRAVENOUS AS NEEDED
Status: DISCONTINUED | OUTPATIENT
Start: 2024-06-12 | End: 2024-06-12

## 2024-06-12 RX ORDER — METRONIDAZOLE 500 MG/100ML
500 INJECTION, SOLUTION INTRAVENOUS ONCE
Status: DISCONTINUED | OUTPATIENT
Start: 2024-06-12 | End: 2024-06-12

## 2024-06-12 RX ORDER — ACETAMINOPHEN 325 MG/1
975 TABLET ORAL ONCE
Status: COMPLETED | OUTPATIENT
Start: 2024-06-12 | End: 2024-06-12

## 2024-06-12 RX ORDER — ONDANSETRON 2 MG/ML
4 INJECTION INTRAMUSCULAR; INTRAVENOUS EVERY 6 HOURS PRN
Status: DISCONTINUED | OUTPATIENT
Start: 2024-06-12 | End: 2024-06-12 | Stop reason: HOSPADM

## 2024-06-12 RX ORDER — HYDROMORPHONE HCL/PF 1 MG/ML
0.5 SYRINGE (ML) INJECTION
Status: DISCONTINUED | OUTPATIENT
Start: 2024-06-12 | End: 2024-06-12 | Stop reason: HOSPADM

## 2024-06-12 RX ORDER — IBUPROFEN 600 MG/1
600 TABLET ORAL EVERY 6 HOURS PRN
Qty: 30 TABLET | Refills: 0 | Status: SHIPPED | OUTPATIENT
Start: 2024-06-12 | End: 2024-07-12

## 2024-06-12 RX ORDER — KETOROLAC TROMETHAMINE 30 MG/ML
INJECTION, SOLUTION INTRAMUSCULAR; INTRAVENOUS AS NEEDED
Status: DISCONTINUED | OUTPATIENT
Start: 2024-06-12 | End: 2024-06-12

## 2024-06-12 RX ORDER — BUPIVACAINE HYDROCHLORIDE 2.5 MG/ML
INJECTION, SOLUTION EPIDURAL; INFILTRATION; INTRACAUDAL AS NEEDED
Status: DISCONTINUED | OUTPATIENT
Start: 2024-06-12 | End: 2024-06-12 | Stop reason: HOSPADM

## 2024-06-12 RX ORDER — ONDANSETRON 2 MG/ML
INJECTION INTRAMUSCULAR; INTRAVENOUS AS NEEDED
Status: DISCONTINUED | OUTPATIENT
Start: 2024-06-12 | End: 2024-06-12

## 2024-06-12 RX ADMIN — SUGAMMADEX 200 MG: 100 INJECTION, SOLUTION INTRAVENOUS at 16:03

## 2024-06-12 RX ADMIN — KETOROLAC TROMETHAMINE 15 MG: 30 INJECTION, SOLUTION INTRAMUSCULAR; INTRAVENOUS at 16:09

## 2024-06-12 RX ADMIN — SUGAMMADEX 200 MG: 100 INJECTION, SOLUTION INTRAVENOUS at 16:22

## 2024-06-12 RX ADMIN — HEPARIN SODIUM 5000 UNITS: 5000 INJECTION INTRAVENOUS; SUBCUTANEOUS at 11:26

## 2024-06-12 RX ADMIN — LORAZEPAM 0.5 MG: 2 INJECTION INTRAMUSCULAR; INTRAVENOUS at 11:24

## 2024-06-12 RX ADMIN — FENTANYL CITRATE 50 MCG: 50 INJECTION INTRAMUSCULAR; INTRAVENOUS at 14:54

## 2024-06-12 RX ADMIN — SODIUM CHLORIDE, SODIUM LACTATE, POTASSIUM CHLORIDE, AND CALCIUM CHLORIDE 125 ML/HR: .6; .31; .03; .02 INJECTION, SOLUTION INTRAVENOUS at 11:25

## 2024-06-12 RX ADMIN — GENTAMICIN SULFATE 99.45 MG: 40 INJECTION, SOLUTION INTRAMUSCULAR; INTRAVENOUS at 14:46

## 2024-06-12 RX ADMIN — CLINDAMYCIN PHOSPHATE 900 MG: 900 INJECTION, SOLUTION INTRAVENOUS at 14:29

## 2024-06-12 RX ADMIN — EPHEDRINE SULFATE 5 MG: 50 INJECTION, SOLUTION INTRAVENOUS at 14:50

## 2024-06-12 RX ADMIN — SODIUM CHLORIDE, SODIUM LACTATE, POTASSIUM CHLORIDE, AND CALCIUM CHLORIDE: .6; .31; .03; .02 INJECTION, SOLUTION INTRAVENOUS at 14:33

## 2024-06-12 RX ADMIN — PROPOFOL 200 MG: 10 INJECTION, EMULSION INTRAVENOUS at 14:24

## 2024-06-12 RX ADMIN — ALBUTEROL SULFATE 10 PUFF: 90 AEROSOL, METERED RESPIRATORY (INHALATION) at 16:26

## 2024-06-12 RX ADMIN — ACETAMINOPHEN 975 MG: 325 TABLET, FILM COATED ORAL at 18:07

## 2024-06-12 RX ADMIN — Medication 100 MCG: at 14:49

## 2024-06-12 RX ADMIN — ROCURONIUM BROMIDE 50 MG: 10 INJECTION, SOLUTION INTRAVENOUS at 14:25

## 2024-06-12 RX ADMIN — SODIUM CHLORIDE: 0.9 INJECTION, SOLUTION INTRAVENOUS at 14:18

## 2024-06-12 RX ADMIN — FENTANYL CITRATE 50 MCG: 50 INJECTION INTRAMUSCULAR; INTRAVENOUS at 14:24

## 2024-06-12 RX ADMIN — FENTANYL CITRATE 50 MCG: 50 INJECTION INTRAMUSCULAR; INTRAVENOUS at 15:34

## 2024-06-12 RX ADMIN — ONDANSETRON 4 MG: 2 INJECTION INTRAMUSCULAR; INTRAVENOUS at 16:04

## 2024-06-12 RX ADMIN — DEXAMETHASONE SODIUM PHOSPHATE 6 MG: 10 INJECTION INTRAMUSCULAR; INTRAVENOUS at 14:54

## 2024-06-12 RX ADMIN — FENTANYL CITRATE 50 MCG: 50 INJECTION INTRAMUSCULAR; INTRAVENOUS at 17:07

## 2024-06-12 RX ADMIN — LIDOCAINE HYDROCHLORIDE 100 MG: 10 INJECTION, SOLUTION EPIDURAL; INFILTRATION; INTRACAUDAL; PERINEURAL at 14:24

## 2024-06-12 RX ADMIN — GABAPENTIN 200 MG: 100 CAPSULE ORAL at 10:50

## 2024-06-12 RX ADMIN — FENTANYL CITRATE 50 MCG: 50 INJECTION INTRAMUSCULAR; INTRAVENOUS at 17:15

## 2024-06-12 RX ADMIN — FENTANYL CITRATE 50 MCG: 50 INJECTION INTRAMUSCULAR; INTRAVENOUS at 15:11

## 2024-06-12 RX ADMIN — MIDAZOLAM 2 MG: 1 INJECTION INTRAMUSCULAR; INTRAVENOUS at 14:20

## 2024-06-12 RX ADMIN — ACETAMINOPHEN 975 MG: 325 TABLET, FILM COATED ORAL at 10:50

## 2024-06-12 RX ADMIN — SODIUM CHLORIDE 125 ML/HR: 0.9 INJECTION, SOLUTION INTRAVENOUS at 17:26

## 2024-06-12 NOTE — OP NOTE
OPERATIVE REPORT  PATIENT NAME: Jeannine Hooks    :  1985  MRN: [id number]  Pt Location: [facility] OR ROOM 03    SURGERY DATE: 2024    Surgeons and Role:     * Adrian Parrish MD - Primary     * Keiko Corbett MD - Assisting    Preop Diagnosis:  Right ovarian cyst [N83.201]    Post-Op Diagnosis Codes:     * Right ovarian cyst [N83.201]    Procedure(s):  Right - TOTAL LAPAROSCOPIC HYSTERECTOMY.  RIGHT OOPHORECTOMY. CYSTOSCOPY    Specimen(s):  ID Type Source Tests Collected by Time Destination   1 : right ovary Tissue Ovary, Right TISSUE EXAM Adrian Parrish MD 2024  3:08 PM    2 : Uterus and cervix Tissue Uterus TISSUE EXAM Adrian Parrish MD 2024  3:35 PM        Estimated Blood Loss:   Minimal    Drains:  * No LDAs found *    Anesthesia Type:   General    Operative Indications:  Right ovarian cyst [N83.201]  39-year-old with 11.4 x 11.9 cm right ovarian cyst, O rad category 3 presents for definitive operative management.    Operative Findings:  1.  Exam under anesthesia revealed a gross normal cervix.  The ovarian mass filled the cul-de-sac.  The uterus was mobile.  2.  On laparoscopy, there is no evidence of upper abdominal or peritoneal disease.  The right ovary was enlarged and filled the cul-de-sac.  It measured approximately 12 cm in diameter.  There were no surface excrescences.  Overall, it appeared benign.  The left ovary was grossly normal.  The uterus was proximately 10 weeks in size.  The fallopian tubes have been removed previously.  3.  Frozen section of the right ovary revealed benign disease.  4.  Cystoscopy revealed bilateral jets and no evidence of bladder injury.      Complications:   None    Procedure and Technique:  After informed consent was obtained, the patient was taken the operating room where general endotracheal anesthesia was administered without incident.  She was then prepped and draped in the normal sterile fashion in the low dorsolithotomy  position.  An examination under anesthesia was then performed with findings noted as above.  A speculum was placed in the patient's vagina.  The anterior lip of the cervix was grasped with a single-tooth tenaculum.  The uterus was sounded to 10 cm.  The cervix was dilated.  A 0 Vicryl stay suture was placed in the cervix and used to secure the Teresa uterine manipulator and El cup.  A Cardenas catheter was then inserted.  Tension was then turned the abdomen.  0.25% Marcaine was used to infiltrate the skin prior to placement of any trocar.  The first insertion site was at the supraumbilical fold.  A 5 mm skin incision was made using 11 blade scalpel.  Through this was passed a 5 mm trocar under direct visualization into the abdominal cavity.  The abdomen is then insufflated to 15 mmHg using CO2 gas.  Findings on laparoscopy are noted as above.  Additional trocars then placed under direct visualization.  A 5 mm trocar was placed in left lower quadrant and a 5 mm trocar placed in the right lower quadrant.  Attention was first turned to the right side of the pelvis.  The right ureter was identified transperitoneally.  The right infundibulopelvic ligament was then skeletonized, cauterized and transected with the Enseal.  The round ligament was cauterized and transected.  The utero-ovarian ligament on the right side was then cauterized and transected and the ovary was then able to be lifted out of the pelvis and placed in the right gutter.  Attention was then turned to the left side of the pelvis.  The round ligament was cauterized and transected.  The retroperitoneal space was developed.  A window was made in the broad ligament above the ureter and the utero-ovarian ligament on the left side was cauterized and transected.  The vesicovaginal space was then developed.  The bladder was then taken down below the level of the external os of the cervix as marked by the Koh cup.  The bilateral uterine vasculature was then  skeletonized, cauterized and transected.  The bilateral cardinal ligaments were cauterized and transected.  A circumferential colpotomy was then performed using monopolar cautery and the uterus and cervix were then removed transvaginally.  A medium anchor bag was then inserted into the pelvis transvaginally and the right ovary was placed in the bag.  The bag was then exteriorized through the vagina and the cyst ruptured in the bag.  There is no spillage of fluid intra-abdominal a.  The ovary was then able to be removed transvaginally.  A 2-0 STRATAFIX suture was then advanced into the pelvis transvaginally and used to close the vagina in a running fashion.  Hemostasis was found to be excellent.  The pelvis was irrigated.  The pressure in the pelvis was brought down to less than 5 mmHg.  There is no evidence of bleeding identified.  The STRATAFIX suture was then removed laparoscopically.  The gas was removed from abdominal cavity.  The ports were removed under direct visualization.  The skin was closed at all sites using 4-0 Monocryl followed by Exofin.  The Cardenas catheter was removed.  The bladder was insufflated with 200 cc of normal saline using the suction irrigation device.  The 5 mm laparoscope was then used as a cystoscope with findings noted as above.  The bladder was then drained.  The Cardenas was removed.  The vagina was inspected.  There is no evidence of bleeding identified.  The patient was then awakened and transferred to the recovery room in stable condition.  All instrument counts correct x 2 for procedure.  No complications.  Estimated blood loss is less than 50 mL.   I was present for the entire procedure.    Patient Disposition:  PACU         SIGNATURE: Adrian Parrish MD  DATE: June 12, 2024  TIME: 4:41 PM

## 2024-06-12 NOTE — ANESTHESIA POSTPROCEDURE EVALUATION
Post-Op Assessment Note    CV Status:  Stable  Pain Score: 0    Pain management: adequate       Mental Status:  Alert and awake   Hydration Status:  Euvolemic   PONV Controlled:  Controlled   Airway Patency:  Patent     Post Op Vitals Reviewed: Yes    No anethesia notable event occurred.    Staff: CRNA               /72 (06/12/24 1637)    Temp (!) 97.4 °F (36.3 °C) (06/12/24 1637)    Pulse (!) 108 (06/12/24 1637)   Resp 18 (06/12/24 1637)    SpO2 100 % (06/12/24 1637)

## 2024-06-12 NOTE — DISCHARGE INSTRUCTIONS
[facility] - Gynecologic Oncology  Jodi Rodriguez, Rohit, and Madi  [phone]    Hysterectomy Discharge Instructions    WHAT YOU NEED TO KNOW:   A hysterectomy is surgery to remove your uterus. Your ovaries, fallopian tubes, cervix, or part of your vagina may also need to be removed. The organs and tissue that will be removed depends on your medical condition.  After a hysterectomy, you will not be able to become pregnant.  If your ovaries are removed, you will go through menopause if you have not already.    DISCHARGE INSTRUCTIONS:   Contact your doctor at the number above if:   You have a fever over 101o.  You have nausea or are vomiting that does not improve after a light meal.   Your pain is getting worse, even after you take medicine.   You feel pain or burning when you urinate, or you have trouble urinating.   You have pus or a foul-smelling odor coming from your vagina.    Your wound is red, swollen, or draining pus.  You see new or an increased amount of bright red blood coming from your vagina or your incisions.   You have questions or concerns about your condition or care.    Seek care immediately:   Your arm or leg feels warm, tender, and painful. It may look swollen and red.  You have increasing abdominal or pelvic pain.   You have heavy vaginal bleeding that fills 1 or more sanitary pads in 1 hour.    Call 911 for any of the following:   You feel lightheaded, short of breath, and have chest pain.   You cough up blood.    Medicines: You may need any of the following:  Prescription medicine may be given. You may receive a prescription for pain medication or be advised to use over the counter (OTC) pain medication such as acetaminophen (Tylenol) or ibuprofen (Advil, Motrin). Ask your healthcare provider how to take this medicine safely.  NSAIDs , such as ibuprofen, help decrease swelling, pain, and fever. NSAIDs can cause stomach bleeding or kidney problems in certain  people. If you take blood thinner medicine, always ask your healthcare provider if NSAIDs are safe for you. Always read the medicine label and follow directions.   Stool softeners help treat or prevent constipation.    Take your medicine as directed. Contact your healthcare provider if you think your medicine is not helping or if you have side effects. Tell him or her if you are allergic to any medicine. Keep a list of the medicines, vitamins, and herbs you take. Include the amounts, and when and why you take them. Bring the list or the pill bottles to follow-up visits. Carry your medicine list with you in case of an emergency.    Activity:   Rest as needed. Get up and move around as directed to help prevent blood clots. Start with short walks and slowly increase the distance every day. Limit the number of times you climb stairs to 2 times each day for the first week. Plan most of your daily activities on one level of your home.      Do not lift objects heavier than 10 pounds for 6 weeks. Avoid strenuous activity for 2 weeks.      Do not strain during bowel movements. High-fiber foods and extra liquids can help you prevent constipation. Examples of high-fiber foods are fruit and bran. Prune juice and water are good liquids to drink.      Do not have sex, use tampons, or douche for up to 8 weeks.     You may shower as soon as the day after surgery.  Tub baths can be taken starting 2 weeks after surgery.Do not go into pools or hot tubs until cleared by your doctor.      Ask when it is safe for you to drive. It is generally safe to drive after 2 weeks and when no longer taking prescription pain medication.    Ask when you may return to work and to other regular activities.    Wound care: Care for your abdominal incisions as directed. Carefully wash around the wound with soap and water. If you have Hibiclens or medicated soap that you were instructed to use before surgery, you may use that to wash with for up to 2 days  after surgery.  If not, any mild non-scented, non-abrasive soap is safe.  It is okay to let the soap and water run over your incision. Do not scrub your incision. Dry the area and put on new, clean bandages as directed. Change your bandages when they get wet or dirty. If you have strips of medical tape, let them fall off on their own. It may take 7 to 14 days for them to fall off. Check your incision every day for redness, swelling, or pus.   Deep breathing: Take deep breaths and cough 10 times each hour. This will decrease your risk for a lung infection. Take a deep breath and hold it for as long as you can. Let the air out and then cough strongly. Deep breaths help open your airway. You may be given an incentive spirometer to help you take deep breaths. Put the plastic piece in your mouth and take a slow, deep breath, then let the air out and cough. Repeat these steps 10 times every hour.   Get support: This surgery may be life-changing for you and your family. You will no longer be able to get pregnant. Sudden changes in the levels of your hormones may occur and cause mood swings and depression. You may feel angry, sad, or frightened, or cry frequently and unexpectedly. These feelings are normal. Talk to your healthcare provider about where you can get support. You can also ask if hormone replacement medicine is right for you.   Follow up with your healthcare provider or gynecologist as directed: You may need to return to have stitches removed, and for other tests. Write down your questions so you remember to ask them during your visits.      © 2017 [employer] Information is for End User's use only and may not be sold, redistributed or otherwise used for commercial purposes. All illustrations and images included in CareNotes® are the copyrighted property of [employer] or [employer].  The above information is an  only. It is not intended as medical advice for  individual conditions or treatments. Talk to your doctor, nurse or pharmacist before following any medical regimen to see if it is safe and effective for you.

## 2024-06-12 NOTE — ANESTHESIA PREPROCEDURE EVALUATION
Procedure:  (LTH), RIGHT OOPHORECTOMY, EUA (Abdomen)    Relevant Problems   Obstetrics/Gynecology   (+) Right ovarian cyst      Orthopedic/Musculoskeletal   (+) De Quervain's tenosynovitis, left      Other   (+) Raynaud phenomenon        Physical Exam    Airway    Mallampati score: II         Dental   No notable dental hx     Cardiovascular  Cardiovascular exam normal    Pulmonary  Pulmonary exam normal     Other Findings  post-pubertal.      Anesthesia Plan  ASA Score- 2     Anesthesia Type- general with ASA Monitors.         Additional Monitors:     Airway Plan: ETT.           Plan Factors-Exercise tolerance (METS): >4 METS.    Chart reviewed.        Patient is not a current smoker.  Patient instructed to abstain from smoking on day of procedure. Patient did not smoke on day of surgery.            Induction- intravenous.    Postoperative Plan-         Informed Consent- Anesthetic plan and risks discussed with patient.

## 2024-06-12 NOTE — INTERVAL H&P NOTE
H&P reviewed. After examining the patient I find no changes in the patients condition since the H&P had been written.    Vitals:    06/12/24 1100   BP: 124/97   Pulse: 98   Resp: 16   Temp: (!) 97.2 °F (36.2 °C)   SpO2: 99%

## 2024-06-20 PROCEDURE — 88344 IMHCHEM/IMCYTCHM EA MLT ANTB: CPT | Performed by: STUDENT IN AN ORGANIZED HEALTH CARE EDUCATION/TRAINING PROGRAM

## 2024-06-20 PROCEDURE — 88332 PATH CONSLTJ SURG EA ADD BLK: CPT | Performed by: STUDENT IN AN ORGANIZED HEALTH CARE EDUCATION/TRAINING PROGRAM

## 2024-06-20 PROCEDURE — 88307 TISSUE EXAM BY PATHOLOGIST: CPT | Performed by: STUDENT IN AN ORGANIZED HEALTH CARE EDUCATION/TRAINING PROGRAM

## 2024-07-10 ENCOUNTER — HOSPITAL ENCOUNTER (OUTPATIENT)
Dept: RADIOLOGY | Age: 39
Discharge: HOME | End: 2024-07-10
Payer: COMMERCIAL

## 2024-07-10 ENCOUNTER — OFFICE VISIT (OUTPATIENT)
Dept: GYNECOLOGIC ONCOLOGY | Facility: CLINIC | Age: 39
End: 2024-07-10

## 2024-07-10 VITALS
SYSTOLIC BLOOD PRESSURE: 110 MMHG | BODY MASS INDEX: 27.76 KG/M2 | OXYGEN SATURATION: 97 % | DIASTOLIC BLOOD PRESSURE: 72 MMHG | HEART RATE: 101 BPM | TEMPERATURE: 97.9 F | WEIGHT: 172 LBS

## 2024-07-10 VITALS — HEIGHT: 66 IN | WEIGHT: 169 LBS | BODY MASS INDEX: 27.16 KG/M2

## 2024-07-10 DIAGNOSIS — Z90.710 STATUS POST HYSTERECTOMY: Primary | ICD-10-CM

## 2024-07-10 DIAGNOSIS — Z12.31 ENCOUNTER FOR SCREENING MAMMOGRAM FOR MALIGNANT NEOPLASM OF BREAST: ICD-10-CM

## 2024-07-10 PROCEDURE — 99024 POSTOP FOLLOW-UP VISIT: CPT | Performed by: PHYSICIAN ASSISTANT

## 2024-07-10 PROCEDURE — 77063 BREAST TOMOSYNTHESIS BI: CPT

## 2024-07-10 PROCEDURE — 77067 SCR MAMMO BI INCL CAD: CPT

## 2024-07-10 NOTE — PROGRESS NOTES
Assessment/Plan:    Problem List Items Addressed This Visit          Surgery/Wound/Pain    Status post hysterectomy - Primary     39-year-old with ORADS3 11 cm right ovarian cyst now s/p total laparoscopic hysterectomy and right oophorectomy on 6/12/24. Her final pathology was benign. She is recovering well from surgery.    Discussed continued post-operative restrictions.   Return to the office in 2-3 weeks for continued post-operative care and vaginal cuff exam.              CHIEF COMPLAINT:  Post-operative evaluation     Problem:  11 cm right ovarian cyst, ORADS 3      Previous therapy:  Total laparoscopic hysterectomy, right oophorectomy and cystoscopy on 6/12/24.  A. Benign, see below.      Patient ID: Jeannine Hooks is a 39 y.o. female  who presents to the office for initial post-operative evaluation. She is recovering well from surgery and without acute complaints. She has been afebrile. Normal bowel/bladder function. NO abdominal pain. NO OTC pain medication. Patient is without vaginal bleeding or discharge.        The following portions of the patient's history were reviewed and updated as appropriate: allergies, current medications, past medical history, past surgical history, and problem list.    Review of Systems   Constitutional: Negative.    Respiratory: Negative.     Cardiovascular: Negative.    Gastrointestinal: Negative.    Genitourinary: Negative.    Skin: Negative.          Current Outpatient Medications:     acetaminophen (TYLENOL) 325 mg tablet, Take 2 tablets (650 mg total) by mouth every 6 (six) hours as needed for mild pain, Disp: 60 tablet, Rfl: 0    calcium carbonate (TUMS) 500 mg chewable tablet, Chew 1 tablet daily, Disp: , Rfl:     cholecalciferol (VITAMIN D3) 1,000 units tablet, Take 5,000 Units by mouth daily, Disp: , Rfl:     famotidine (PEPCID) 10 mg tablet, Take 10 mg by mouth 2 (two) times a day, Disp: , Rfl:     ibuprofen (MOTRIN) 600 mg tablet, Take 1 tablet (600 mg total) by mouth  every 6 (six) hours as needed for mild pain, Disp: 30 tablet, Rfl: 0    Objective:    Blood pressure 110/72, pulse 101, temperature 97.9 °F (36.6 °C), temperature source Temporal, weight 78 kg (172 lb), last menstrual period 06/03/2024, SpO2 97%, not currently breastfeeding.  Body mass index is 27.76 kg/m².  Body surface area is 1.88 meters squared.    Physical Exam  Vitals reviewed.   Constitutional:       General: She is not in acute distress.     Appearance: Normal appearance.   HENT:      Head: Normocephalic and atraumatic.      Mouth/Throat:      Mouth: Mucous membranes are moist.   Pulmonary:      Effort: Pulmonary effort is normal.      Breath sounds: Normal breath sounds.   Abdominal:      Palpations: Abdomen is soft. There is no mass.      Tenderness: There is no abdominal tenderness.   Skin:     General: Skin is warm and dry.      Comments: Surgical trocar sites are intact, clean and dry without induration, erythema or purulent drainage.    Neurological:      Mental Status: She is alert and oriented to person, place, and time.   Psychiatric:         Mood and Affect: Mood normal.         Behavior: Behavior normal.         Thought Content: Thought content normal.         Judgment: Judgment normal.       Case Report   Surgical Pathology Report                         Case: [id number]                                   Authorizing Provider:  Adrian Parrish,   Collected:           06/12/2024 Antoine VALENCIA                                                                           Ordering Location:     [facility]        Received:            06/12/2024 [address] [place] [address] Operating Room                                                     Pathologist:           Hernandez Hunt MD                                                                            Intraop:               Hernandez Hunt MD                                                                           Specimens:   A) - Ovary, Right, right ovary                                                                      B) - Uterus, Uterus and cervix                                                            Final Diagnosis   A. OVARY, RIGHT, OOPHORECTOMY:    - Mucinous cystadenoma with calcifications.    - Background benign physiologic changes.     B. UTERUS AND CERVIX, HYSTERECTOMY:    - Benign proliferative endometrium.     - Benign squamous and endocervical mucosa; see note.    - Negative for endometrioid intraepithelial neoplasia (EIN) or malignancy (endometrium is entirely submitted).         Comments:   This is an appended report. These results have been appended to a previously preliminary verified report.      Electronically signed by Hernandez Hunt MD on 6/20/2024 at  1:27 PM  Preliminary result electronically signed by Hernandez Hunt MD on 6/19/2024 at  9:10 AM

## 2024-07-10 NOTE — ASSESSMENT & PLAN NOTE
39-year-old with ORADS3 11 cm right ovarian cyst now s/p total laparoscopic hysterectomy and right oophorectomy on 6/12/24. Her final pathology was benign. She is recovering well from surgery.    Discussed continued post-operative restrictions.   Return to the office in 2-3 weeks for continued post-operative care and vaginal cuff exam.

## 2024-07-25 ENCOUNTER — HOSPITAL ENCOUNTER (OUTPATIENT)
Dept: ULTRASOUND IMAGING | Facility: CLINIC | Age: 39
Discharge: HOME | End: 2024-07-25
Payer: COMMERCIAL

## 2024-07-25 DIAGNOSIS — Z12.31 ENCOUNTER FOR SCREENING MAMMOGRAM FOR MALIGNANT NEOPLASM OF BREAST: Primary | ICD-10-CM

## 2024-07-25 DIAGNOSIS — R92.8 ABNORMAL MAMMOGRAM: ICD-10-CM

## 2024-07-25 PROCEDURE — 76642 ULTRASOUND BREAST LIMITED: CPT

## 2024-07-29 ENCOUNTER — OFFICE VISIT (OUTPATIENT)
Dept: GYNECOLOGIC ONCOLOGY | Facility: CLINIC | Age: 39
End: 2024-07-29

## 2024-07-29 VITALS
SYSTOLIC BLOOD PRESSURE: 118 MMHG | BODY MASS INDEX: 27.92 KG/M2 | OXYGEN SATURATION: 98 % | TEMPERATURE: 98.2 F | WEIGHT: 173 LBS | HEART RATE: 95 BPM | DIASTOLIC BLOOD PRESSURE: 80 MMHG

## 2024-07-29 DIAGNOSIS — Z90.710 STATUS POST HYSTERECTOMY: Primary | ICD-10-CM

## 2024-07-29 PROCEDURE — 99024 POSTOP FOLLOW-UP VISIT: CPT | Performed by: PHYSICIAN ASSISTANT

## 2024-07-29 NOTE — PROGRESS NOTES
Assessment/Plan:    Problem List Items Addressed This Visit          Surgery/Wound/Pain    Status post hysterectomy - Primary     39-year-old with ORADS3 11 cm right ovarian cyst now s/p total laparoscopic hysterectomy and right oophorectomy on 6/12/24. Her final pathology was benign. Her vaginal cuff is healed.     Discussed continued pelvic rest for an additional 2-3 weeks.     Return to the office prn.   Follow-up annually with routine gynecology.              CHIEF COMPLAINT:  Post-operative evaluation       Problem:  11 cm right ovarian cyst, ORADS 3       Previous therapy:  Total laparoscopic hysterectomy, right oophorectomy and cystoscopy on 6/12/24.  A. Benign, see below.        Patient ID: Jeannine Hooks is a 39 y.o. female  who presents to the office for continued post-operative evaluation. She has recovered well and is without acute complaints. She denies n/v/abdominal pain. Normal bowel/bladder function. No vaginal bleeding or discharge.         The following portions of the patient's history were reviewed and updated as appropriate: allergies, current medications, past medical history, past surgical history, and problem list.    Review of Systems   Constitutional: Negative.    Respiratory: Negative.     Cardiovascular: Negative.    Gastrointestinal: Negative.    Genitourinary: Negative.    Skin: Negative.          Current Outpatient Medications:     calcium carbonate (TUMS) 500 mg chewable tablet, Chew 1 tablet daily, Disp: , Rfl:     cholecalciferol (VITAMIN D3) 1,000 units tablet, Take 5,000 Units by mouth daily, Disp: , Rfl:     famotidine (PEPCID) 10 mg tablet, Take 10 mg by mouth 2 (two) times a day, Disp: , Rfl:     ibuprofen (MOTRIN) 600 mg tablet, Take 1 tablet (600 mg total) by mouth every 6 (six) hours as needed for mild pain, Disp: 30 tablet, Rfl: 0    Objective:    Blood pressure 118/80, pulse 95, temperature 98.2 °F (36.8 °C), temperature source Temporal, weight 78.5 kg (173 lb), last  menstrual period 06/03/2024, SpO2 98%, not currently breastfeeding.  Body mass index is 27.92 kg/m².  Body surface area is 1.88 meters squared.    Physical Exam  Vitals reviewed. Exam conducted with a chaperone present.   Constitutional:       General: She is not in acute distress.     Appearance: Normal appearance.   HENT:      Head: Normocephalic and atraumatic.      Mouth/Throat:      Mouth: Mucous membranes are moist.   Abdominal:      Palpations: Abdomen is soft. There is no mass.      Tenderness: There is no abdominal tenderness.   Genitourinary:     Comments: The external female genitalia is normal. The bartholin's, uretheral and skenes glands are normal. The urethral meatus is normal (midline with no lesions). Anus without fissure or lesion. Speculum exam reveals a grossly normal vagina. Vagina is intact, without dehiscense. No masses, lesions, discharge or bleeding. No significant cystocele or rectocele noted. Bimanual exam notes a surgical absent cervix, uterus and adnexal structures. No masses or fullness. Bladder is without fullness, mass or tenderness.  Skin:     General: Skin is warm and dry.      Comments: Surgical trocar sites are well healed.    Neurological:      Mental Status: She is alert and oriented to person, place, and time.   Psychiatric:         Mood and Affect: Mood normal.         Behavior: Behavior normal.         Thought Content: Thought content normal.         Judgment: Judgment normal.

## 2024-07-29 NOTE — ASSESSMENT & PLAN NOTE
39-year-old with ORADS3 11 cm right ovarian cyst now s/p total laparoscopic hysterectomy and right oophorectomy on 6/12/24. Her final pathology was benign. Her vaginal cuff is healed.     Discussed continued pelvic rest for an additional 2-3 weeks.     Return to the office prn.   Follow-up annually with routine gynecology.

## 2024-11-11 ENCOUNTER — NURSE TRIAGE (OUTPATIENT)
Age: 39
End: 2024-11-11

## 2024-11-11 NOTE — TELEPHONE ENCOUNTER
"[facility] calling to report dull pelvic pain/cramping/fullness for the last few weeks with intermittent shooting pain to her vagina. Pain is worse with bending or pressure to pelvis is sometime L>R. Appointment scheduled for tomorrow.    Reason for Disposition  • Pelvic pain is a chronic symptom (recurrent or ongoing AND present > 4 weeks)    Answer Assessment - Initial Assessment Questions  1. LOCATION: \"Where does it hurt?\"       Pelvic fullness with intermittent almost lightening crotch sensation. Sometimes more on left  2. RADIATION: \"Does the pain shoot anywhere else?\" (e.g., lower back, groin, thighs)      Radiates down to vagina  3. ONSET: \"When did the pain begin?\" (e.g., minutes, hours or days ago)       A few weeks  4. SUDDEN: \"Gradual or sudden onset?\"      Gradually but persistent  5. PATTERN \"Does the pain come and go, or is it constant?\"      Contacts pelvic fullness/dull cramping with intermittent shooting pain to vagina  6. SEVERITY: \"How bad is the pain?\"  (e.g., Scale 1-10; mild, moderate, or severe)      1-2/10 pelvic fullness/cramping, 4/10 intermittent vagina shooting pain  7. RECURRENT SYMPTOM: \"Have you ever had this type of pelvic pain before?\" If Yes, ask: \"When was the last time?\" and \"What happened that time?\"       Seems likes similar pain but that had a different cause  8. CAUSE: \"What do you think is causing the pelvic pain?\"      unsure  9. RELIEVING/AGGRAVATING FACTORS: \"What makes it better or worse?\" (e.g., activity/rest, sexual intercourse, voiding, passing stool)      Bending at pelvis/pressure makes it worse  10. OTHER SYMPTOMS: \"Has there been any other symptoms?\" (e.g., fever, constipation, diarrhea, urine problems, vaginal bleeding, vaginal discharge, or vomiting?\"        Denies nausea, vomiting, fever, abnormal discharge/odor  Intermittent diarrhea - feels this is related to stress/anxiety    11. PREGNANCY: \"Is there any chance you are pregnant?\" \"When was your last menstrual " "period?\"        Hysterectomy    Protocols used: Pelvic Pain - Female-Adult-[place]    "

## 2024-11-12 ENCOUNTER — OFFICE VISIT (OUTPATIENT)
Dept: OBGYN CLINIC | Facility: CLINIC | Age: 39
End: 2024-11-12
Payer: COMMERCIAL

## 2024-11-12 VITALS
SYSTOLIC BLOOD PRESSURE: 138 MMHG | WEIGHT: 174 LBS | DIASTOLIC BLOOD PRESSURE: 90 MMHG | BODY MASS INDEX: 27.97 KG/M2 | HEIGHT: 66 IN

## 2024-11-12 DIAGNOSIS — R19.00 PELVIC FULLNESS: Primary | ICD-10-CM

## 2024-11-12 PROCEDURE — 99213 OFFICE O/P EST LOW 20 MIN: CPT

## 2024-11-12 NOTE — PROGRESS NOTES
"Ambulatory Visit  Name: Jeannine Hooks      : 1985      MRN: [id number]  Encounter Provider: AKASH Lees  Encounter Date: 2024   Encounter department: [facility]    Assessment & Plan  Pelvic fullness  -TVUS ordered for evaluation of L ovary and concern for left ovarian cyst.  -Reviewed other etiologies for pelvic fullness/discomfort including GI components (colitis/constipation) and urinary/vaginal prolapse.  -If TVUS is unremarkable, would advise pelvic floor PT.  -If TVUS indicates ovarian cyst, would advise evaluation by [facility], due to established patient and family history.    Orders:    US pelvis complete w transvaginal; Future      History of Present Illness       Jeannine Hooks is a 39 y.o. female who presents for pelvic fullness and pain.    Pelvic pain/fullness x \"a couple weeks\", with intermittent shooting to vagina. Frequency is coming daily.   She reports pain is similar to when she had R ovarian cyst.    Pain rating 2-3.  Pain location: pain is primarily left with discomfort generalized lower abdomen.  Denies diarrhea/constipation.  Denies dysuria/frequency/urgency  Denies vaginal discharge/irritation/itching.  History significant for vaginal wall prolapse and S/P total laparoscopic hysterectomy and right oophorectomy on 22 for ORADS3.   Family history of ovarian cancer.        History obtained from : patient  Review of Systems   Genitourinary:  Positive for pelvic pain. Negative for dysuria, flank pain, frequency, urgency, vaginal bleeding, vaginal discharge and vaginal pain.     Medical History Reviewed by provider this encounter:       Current Outpatient Medications on File Prior to Visit   Medication Sig Dispense Refill    calcium carbonate (TUMS) 500 mg chewable tablet Chew 1 tablet daily      cholecalciferol (VITAMIN D3) 1,000 units tablet Take 5,000 Units by mouth daily      famotidine (PEPCID) 10 mg tablet Take 10 mg by mouth 2 " "(two) times a day      ibuprofen (MOTRIN) 600 mg tablet Take 1 tablet (600 mg total) by mouth every 6 (six) hours as needed for mild pain 30 tablet 0     No current facility-administered medications on file prior to visit.      Social History     Tobacco Use    Smoking status: Never    Smokeless tobacco: Never   Vaping Use    Vaping status: Never Used   Substance and Sexual Activity    Alcohol use: Yes     Alcohol/week: 7.0 standard drinks of alcohol     Types: 7 Glasses of wine per week     Comment: socially     Drug use: No    Sexual activity: Yes     Partners: Male     Birth control/protection: Female Sterilization         Objective     /90 (BP Location: Left arm, Patient Position: Sitting, Cuff Size: Standard)   Ht 5' 6\" (1.676 m)   Wt 78.9 kg (174 lb)   LMP 06/03/2024 (Exact Date)   BMI 28.08 kg/m²     Physical Exam  Vitals and nursing note reviewed.   Constitutional:       General: She is not in acute distress.     Appearance: Normal appearance.   HENT:      Head: Normocephalic.   Eyes:      Conjunctiva/sclera: Conjunctivae normal.   Pulmonary:      Effort: Pulmonary effort is normal. No respiratory distress.   Abdominal:      General: Abdomen is flat.      Palpations: Abdomen is soft.   Genitourinary:     General: Normal vulva.      Exam position: Lithotomy position.      Pubic Area: No rash or pubic lice.       Labia:         Right: No rash or tenderness.         Left: No rash or tenderness.       Urethra: No urethral pain.      Vagina: Normal.      Adnexa: Right adnexa normal.        Left: Tenderness present. No mass or fullness.        Comments: Vaginal cuff healed. Surgically absent cervix and uterus. Normal right adnexa on bimanual exam. L adnexal tenderness (mild) with palpation, no fullness or mass noted.   Musculoskeletal:         General: Normal range of motion.      Cervical back: Normal range of motion.      Right lower leg: No edema.      Left lower leg: No edema.   Lymphadenopathy:      " Lower Body: No right inguinal adenopathy. No left inguinal adenopathy.   Skin:     General: Skin is warm and dry.   Neurological:      Mental Status: She is alert and oriented to person, place, and time.   Psychiatric:         Mood and Affect: Mood normal.         Behavior: Behavior normal.         Thought Content: Thought content normal.         Judgment: Judgment normal.

## 2024-11-27 ENCOUNTER — HOSPITAL ENCOUNTER (OUTPATIENT)
Dept: RADIOLOGY | Age: 39
Discharge: HOME | End: 2024-11-27
Payer: COMMERCIAL

## 2024-11-27 DIAGNOSIS — R19.00 PELVIC FULLNESS: ICD-10-CM

## 2024-11-27 PROCEDURE — 76856 US EXAM PELVIC COMPLETE: CPT

## 2024-11-27 PROCEDURE — 76830 TRANSVAGINAL US NON-OB: CPT

## 2024-12-02 ENCOUNTER — RESULTS FOLLOW-UP (OUTPATIENT)
Dept: OBGYN CLINIC | Facility: CLINIC | Age: 39
End: 2024-12-02

## 2025-04-01 ENCOUNTER — NURSE TRIAGE (OUTPATIENT)
Dept: OBGYN CLINIC | Facility: CLINIC | Age: 40
End: 2025-04-01

## 2025-04-02 NOTE — PATIENT COMMUNICATION
"Patient calling in, expression concerns for depression- she reports she had a hysterectomy in 2024 and is unsure of it can be related to her hormones. She is not currently taking any antidepressant or seeing any therapists but would like to start.  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  Her  is a good support, and encouraged her to seek additional help.  She denies SI/HI but has thought \"maybe people would be okay and better off without me\".  She denies a plan.  Advised reaching out to her PCP today as well. Appointment scheduled for OV to discuss with gyn provider as well.  Patient verbalized understanding to go to ED if SI/HI, worsening depression.     Tamica Vasquez RN  4/2/2025 10:41 AM  Sign at exiting of workspace  Reason for Disposition   Symptoms interfere with work or school    Answer Assessment - Initial Assessment Questions  1. CONCERN: \"What happened that made you call today?\"      Depression, not feeling like she has any desire to do anything  2. DEPRESSION SYMPTOM SCREENING: \"How are you feeling overall?\" (e.g., decreased energy, increased sleeping or difficulty sleeping, difficulty concentrating, feelings of sadness, guilt, hopelessness, or worthlessness)      sadness  3. RISK OF HARM - SUICIDAL IDEATION:  \"Do you ever have thoughts of hurting or killing yourself?\"  (e.g., yes, no, no but preoccupation with thoughts about death)      No plan and would not- but has has thoughts that maybe people would be better off without her  4. RISK OF HARM - HOMICIDAL IDEATION:  \"Do you ever have thoughts of hurting or killing someone else?\"  (e.g., yes, no, no but preoccupation with thoughts about death)      no  5. FUNCTIONAL IMPAIRMENT: \"How have things been going for you overall? Have you " "had more difficulty than usual doing your normal daily activities?\"  (e.g., better, same, worse; self-care, school, work, interactions)      Things are piling up and she's usually on top of things  6. SUPPORT: \"Who is with you now?\" \"Who do you live with?\" \"Do you have family or friends who you can talk to?\"       - opened up to him last night and prompted her to seek help  7. THERAPIST: \"Do you have a counselor or therapist?\" If Yes, ask: \"What is their name?\"      no  8. STRESSORS: \"Has there been any new stress or recent changes in your life?\"      Sale of a family cabin that she enjoyed spending time at  9. ALCOHOL USE OR SUBSTANCE USE (DRUG USE): \"Do you drink alcohol or use any illegal drugs?\"      no  10. OTHER: \"Do you have any other physical symptoms right now?\" (e.g., fever)        Feeling tired like she could lay around all day  11. PREGNANCY: \"Is there any chance you are pregnant?\" \"When was your last menstrual period?\"        Hyster in 2024    Protocols used: Depression-Adult-[place]          "

## 2025-04-02 NOTE — TELEPHONE ENCOUNTER
"Reason for Disposition  • Symptoms interfere with work or school    Answer Assessment - Initial Assessment Questions  1. CONCERN: \"What happened that made you call today?\"      Depression, not feeling like she has any desire to do anything  2. DEPRESSION SYMPTOM SCREENING: \"How are you feeling overall?\" (e.g., decreased energy, increased sleeping or difficulty sleeping, difficulty concentrating, feelings of sadness, guilt, hopelessness, or worthlessness)      sadness  3. RISK OF HARM - SUICIDAL IDEATION:  \"Do you ever have thoughts of hurting or killing yourself?\"  (e.g., yes, no, no but preoccupation with thoughts about death)      No plan and would not- but has has thoughts that maybe people would be better off without her  4. RISK OF HARM - HOMICIDAL IDEATION:  \"Do you ever have thoughts of hurting or killing someone else?\"  (e.g., yes, no, no but preoccupation with thoughts about death)      no  5. FUNCTIONAL IMPAIRMENT: \"How have things been going for you overall? Have you had more difficulty than usual doing your normal daily activities?\"  (e.g., better, same, worse; self-care, school, work, interactions)      Things are piling up and she's usually on top of things  6. SUPPORT: \"Who is with you now?\" \"Who do you live with?\" \"Do you have family or friends who you can talk to?\"       - opened up to him last night and prompted her to seek help  7. THERAPIST: \"Do you have a counselor or therapist?\" If Yes, ask: \"What is their name?\"      no  8. STRESSORS: \"Has there been any new stress or recent changes in your life?\"      Sale of a family cabin that she enjoyed spending time at  9. ALCOHOL USE OR SUBSTANCE USE (DRUG USE): \"Do you drink alcohol or use any illegal drugs?\"      no  10. OTHER: \"Do you have any other physical symptoms right now?\" (e.g., fever)        Feeling tired like she could lay around all day  11. PREGNANCY: \"Is there any chance you are pregnant?\" \"When was your last menstrual period?\"     "    [place]ster in 2024    Protocols used: Depression-Adult-[place]

## 2025-04-17 NOTE — PROGRESS NOTES
Name: Jeannine Hooks      : 1985      MRN: [id number]  Encounter Provider: AKASH Lees  Encounter Date: 2025   Encounter department: [facility]  :  Assessment & Plan  Depression, unspecified depression type  -Discussed depression and management.   -Advised to continue Wellbutrin and follow-up with PCP for further evaluation.  -Advised to continue with therapy. Discussed how therapy can be an outlet but also, a learning experience for cognitive tools.   -She was concerned for perimenopausal symptoms due to right oophorectomy. Reviewed that she still has left ovary but unclear at what capacity the left ovary is functioning and that depression and decreased libido are common symptoms of perimenopause.  -Offered blood work to evaluate for susan/menopause. However, reviewed that this blood work is labile and may not tell us a lot of information. Patient desires; blood work ordered.  -Counseled patient that the Wellbutrin is still new and that I would not start HRT at this time until Wellbutrin is fully effective (6 weeks).  -I discussed the long term health benefits of HRT including: risk reduction for CVD,  hyperlipidemia, DM, colon cancer, osteoporosis, and cognitive decline, Alzheimer's disease.   -Will f/u with patient on blood work and symptom management after Wellbutrin has been fully effective.  -Advised immediate medical attention for SI/HI  Orders:    Estradiol; Future    Follicle stimulating hormone; Future    Luteinizing hormone; Future        History of Present Illness   HPI  Jeannine Hooks is a 40 y.o. female who presents for concerns of depression.    Hysterectomy and R oophorectomy in .  R oophorectomy due to large cyst  Hysterectomy completed due to significant family history of ovarian and uterine cancer.   She has not seen a genetic counselor but is unsure if she desires.    She saw her PCP on  for depression.   Vitamin D mildly low at  21  She was started on Wellbutrin  mg 24 hr tablet on 4/2.   She just started with a therapist. She has only had one session.    She reports symptoms started shortly after hysterectomy.   Symptoms include exhaustion and not feeling juan jose.  She reports decreased libido.    Jeannine reports the Wellbutrin is helping with negative thoughts.       History obtained from: patient    Review of Systems  Medical History Reviewed by provider this encounter:     .  Current Outpatient Medications on File Prior to Visit   Medication Sig Dispense Refill    calcium carbonate (TUMS) 500 mg chewable tablet Chew 1 tablet daily      cholecalciferol (VITAMIN D3) 1,000 units tablet Take 5,000 Units by mouth daily      famotidine (PEPCID) 10 mg tablet Take 10 mg by mouth 2 (two) times a day      ibuprofen (MOTRIN) 600 mg tablet Take 1 tablet (600 mg total) by mouth every 6 (six) hours as needed for mild pain 30 tablet 0     No current facility-administered medications on file prior to visit.      Social History     Tobacco Use    Smoking status: Never    Smokeless tobacco: Never   Vaping Use    Vaping status: Never Used   Substance and Sexual Activity    Alcohol use: Yes     Alcohol/week: 7.0 standard drinks of alcohol     Types: 7 Glasses of wine per week     Comment: socially     Drug use: No    Sexual activity: Yes     Partners: Male     Birth control/protection: Female Sterilization        Objective   LMP 06/03/2024 (Exact Date)      Physical Exam  Vitals and nursing note reviewed.   Constitutional:       General: She is not in acute distress.     Appearance: Normal appearance.   HENT:      Head: Normocephalic.   Eyes:      Conjunctiva/sclera: Conjunctivae normal.   Pulmonary:      Effort: Pulmonary effort is normal. No respiratory distress.   Abdominal:      General: Abdomen is flat.      Palpations: Abdomen is soft.   Musculoskeletal:         General: Normal range of motion.      Cervical back: Normal range of motion.      Right  lower leg: No edema.      Left lower leg: No edema.   Lymphadenopathy:      Cervical: No cervical adenopathy.   Skin:     General: Skin is warm and dry.   Neurological:      Mental Status: She is alert and oriented to person, place, and time.   Psychiatric:         Mood and Affect: Mood normal.         Behavior: Behavior normal.         Thought Content: Thought content normal.         Judgment: Judgment normal.

## 2025-04-21 ENCOUNTER — OFFICE VISIT (OUTPATIENT)
Dept: OBGYN CLINIC | Facility: CLINIC | Age: 40
End: 2025-04-21
Payer: COMMERCIAL

## 2025-04-21 VITALS
HEIGHT: 66 IN | BODY MASS INDEX: 27 KG/M2 | DIASTOLIC BLOOD PRESSURE: 70 MMHG | WEIGHT: 168 LBS | SYSTOLIC BLOOD PRESSURE: 110 MMHG

## 2025-04-21 DIAGNOSIS — F32.A DEPRESSION, UNSPECIFIED DEPRESSION TYPE: Primary | ICD-10-CM

## 2025-04-21 PROCEDURE — 99213 OFFICE O/P EST LOW 20 MIN: CPT

## 2025-04-21 RX ORDER — BUPROPION HYDROCHLORIDE 150 MG/1
1 TABLET ORAL DAILY
COMMUNITY
Start: 2025-04-02

## 2025-07-08 ENCOUNTER — APPOINTMENT (OUTPATIENT)
Dept: LAB | Age: 40
End: 2025-07-08
Payer: COMMERCIAL

## 2025-07-08 DIAGNOSIS — F32.A DEPRESSION, UNSPECIFIED DEPRESSION TYPE: ICD-10-CM

## 2025-07-08 LAB
ESTRADIOL SERPL-MCNC: 74.3 PG/ML
FSH SERPL-ACNC: 10.9 MIU/ML
LH SERPL-ACNC: 12.3 MIU/ML

## 2025-07-08 PROCEDURE — 83001 ASSAY OF GONADOTROPIN (FSH): CPT

## 2025-07-08 PROCEDURE — 36415 COLL VENOUS BLD VENIPUNCTURE: CPT

## 2025-07-08 PROCEDURE — 82670 ASSAY OF TOTAL ESTRADIOL: CPT

## 2025-07-08 PROCEDURE — 83002 ASSAY OF GONADOTROPIN (LH): CPT

## (undated) DEVICE — SUT STRATAFIX SPIRAL 2-0 CT-1 30 CM SXPD1B401

## (undated) DEVICE — ENDOPATH XCEL UNIVERSAL TROCAR STABLILITY SLEEVES: Brand: ENDOPATH XCEL

## (undated) DEVICE — ACCESS PLATFORM FOR MINIMALLY INVASIVE SURGERY: Brand: GELPOINT®  MINI ADVANCED ACCESS PLATFORM

## (undated) DEVICE — Device: Brand: TISSUE RETRIEVAL SYSTEM

## (undated) DEVICE — GLOVE SRG LF STRL BGL SKNSNS 7 PF

## (undated) DEVICE — SCD SEQUENTIAL COMPRESSION COMFORT SLEEVE MEDIUM KNEE LENGTH: Brand: KENDALL SCD

## (undated) DEVICE — SURGICEL 2 X 3

## (undated) DEVICE — STERILE MINOR LAPAROSCOPY PACK: Brand: CARDINAL HEALTH

## (undated) DEVICE — METZENBAUM ADTEC SINGLE USE DISSECTING SCISSORS, SHAFT ONLY, MONOPOLAR, CURVED TO LEFT, WORKING LENGTH: 12 1/4", (310 MM), DIAM. 5 MM, INSULATED, DOUBLE ACTION, STERILE, DISPOSABLE, PACKAGE OF 10 PIECES: Brand: AESCULAP

## (undated) DEVICE — LAPAROSCOPIC SMOKE EVAC TUBING

## (undated) DEVICE — FLEXIBLE ADHESIVE BANDAGE,X-LARGE: Brand: CURITY

## (undated) DEVICE — TROCAR PORT ACCESS 12 X120MM W/BLDLS OPTICAL TIP AIRSEAL

## (undated) DEVICE — SYRINGE 10ML LL

## (undated) DEVICE — [HIGH FLOW INSUFFLATOR,  DO NOT USE IF PACKAGE IS DAMAGED,  KEEP DRY,  KEEP AWAY FROM SUNLIGHT,  PROTECT FROM HEAT AND RADIOACTIVE SOURCES.]: Brand: PNEUMOSURE

## (undated) DEVICE — LAPAROSCOPIC TROCAR SLEEVE/SINGLE USE: Brand: KII® OPTICAL ACCESS SYSTEM

## (undated) DEVICE — IRRIG ENDO FLO TUBING

## (undated) DEVICE — SUT VICRYL 4-0 PS-2 27 IN J426H

## (undated) DEVICE — ENSEAL LAPAROSCOPIC TISSUE SEALER G2 ARTICULATING STRAIGHT JAW FOR USE WITH G2 GENERATOR 5MM DIAMETER 35CM SHAFT LENGTH: Brand: ENSEAL

## (undated) DEVICE — TROCAR SITE CLOSURE DEVICE: Brand: ENDO CLOSE

## (undated) DEVICE — REM POLYHESIVE ADULT PATIENT RETURN ELECTRODE: Brand: VALLEYLAB

## (undated) DEVICE — OCCLUDER COLPO-PNEUMO

## (undated) DEVICE — GLOVE PI ULTRA TOUCH SZ.7.5

## (undated) DEVICE — ANTI-FOG SOLUTION WITH FOAM PAD: Brand: DEVON

## (undated) DEVICE — TRAP,MUCUS SPECIMEN, 80CC: Brand: MEDLINE

## (undated) DEVICE — CHLORAPREP HI-LITE 26ML ORANGE

## (undated) DEVICE — 3M™ STERI-STRIP™ REINFORCED ADHESIVE SKIN CLOSURES, R1547, 1/2 IN X 4 IN (12 MM X 100 MM), 6 STRIPS/ENVELOPE: Brand: 3M™ STERI-STRIP™

## (undated) DEVICE — INSUFFLATION TUBING PRIMFLO

## (undated) DEVICE — ENDOPATH XCEL BLADELESS TROCARS WITH STABILITY SLEEVES: Brand: ENDOPATH XCEL

## (undated) DEVICE — 3M™ IOBAN™ 2 ANTIMICROBIAL INCISE DRAPE 6648EZ: Brand: IOBAN™ 2

## (undated) DEVICE — SYRINGE 50ML LL

## (undated) DEVICE — 2000CC GUARDIAN II: Brand: GUARDIAN

## (undated) DEVICE — IV EXTENSION TUBING 33 IN

## (undated) DEVICE — 3000CC GUARDIAN II: Brand: GUARDIAN

## (undated) DEVICE — DRAPE EQUIPMENT RF WAND

## (undated) DEVICE — GLOVE INDICATOR PI UNDERGLOVE SZ 8 BLUE

## (undated) DEVICE — TRAY FOLEY 16FR URIMETER SURESTEP

## (undated) DEVICE — SUT VICRYL 0 CT-1 36 IN J946H

## (undated) DEVICE — MAYO STAND COVER: Brand: CONVERTORS

## (undated) DEVICE — BETHLEHEM UNIVERSAL GYN LAP PK: Brand: CARDINAL HEALTH

## (undated) DEVICE — INTENDED FOR TISSUE SEPARATION, AND OTHER PROCEDURES THAT REQUIRE A SHARP SURGICAL BLADE TO PUNCTURE OR CUT.: Brand: BARD-PARKER SAFETY BLADES SIZE 11, STERILE

## (undated) DEVICE — SUT MONOCRYL 4-0 PS-2 27 IN Y426H

## (undated) DEVICE — DRAPE TOWEL: Brand: CONVERTORS

## (undated) DEVICE — TROCAR: Brand: KII® SLEEVE

## (undated) DEVICE — TROCAR: Brand: KII FIOS FIRST ENTRY

## (undated) DEVICE — DRAPE SURGIKIT SADDLE BAG LAP

## (undated) DEVICE — SUT VICRYL 0 UR-6 27 IN J603H

## (undated) DEVICE — BLUE HEAT SCOPE WARMER

## (undated) DEVICE — GLOVE INDICATOR PI UNDERGLOVE SZ 7 BLUE

## (undated) DEVICE — SUT VICRYL 2-0 CT-1 36 IN J945H

## (undated) DEVICE — ADHESIVE SKN CLSR HISTOACRYL FLEX 0.5ML LF

## (undated) DEVICE — NEEDLE 25G X 1 1/2

## (undated) DEVICE — ENSEAL X1 TISSUE SEALER, CURVED JAW, 37 CM SHAFT LENGTH: Brand: ENSEAL

## (undated) DEVICE — UTERINE MANIPULATOR RUMI 6.7 X 10 CM

## (undated) DEVICE — EXOFIN PRECISION PEN HIGH VISCOSITY TOPICAL SKIN ADHESIVE: Brand: EXOFIN PRECISION PEN, 1G

## (undated) DEVICE — TRAY FOLEY 16FR URIMETER SILICONE SURESTEP